# Patient Record
Sex: MALE | Race: BLACK OR AFRICAN AMERICAN | Employment: OTHER | URBAN - METROPOLITAN AREA
[De-identification: names, ages, dates, MRNs, and addresses within clinical notes are randomized per-mention and may not be internally consistent; named-entity substitution may affect disease eponyms.]

---

## 2017-01-31 DIAGNOSIS — I10 ESSENTIAL HYPERTENSION: ICD-10-CM

## 2017-02-01 RX ORDER — VALSARTAN 160 MG/1
TABLET ORAL
Qty: 90 TAB | Refills: 1 | Status: SHIPPED | OUTPATIENT
Start: 2017-02-01 | End: 2017-07-20 | Stop reason: SDUPTHER

## 2017-02-03 ENCOUNTER — TELEPHONE (OUTPATIENT)
Dept: INTERNAL MEDICINE CLINIC | Age: 77
End: 2017-02-03

## 2017-02-03 NOTE — TELEPHONE ENCOUNTER
Pt called in regarding a phone call advising him its time for colonoscopy. I advised that colonoscopy due 3/6/17 and gave pt Dr Laura Angela (per referral order from 9/2016 from Dr Balbina Morris) phone number to call and have it scheduled. He understood and he advised that he was going to call and schedule the colonoscopy.

## 2017-03-24 RX ORDER — ERGOCALCIFEROL 1.25 MG/1
CAPSULE ORAL
Qty: 12 CAP | Refills: 0 | Status: SHIPPED | OUTPATIENT
Start: 2017-03-24 | End: 2017-06-21 | Stop reason: SDUPTHER

## 2017-05-05 RX ORDER — ATORVASTATIN CALCIUM 10 MG/1
10 TABLET, FILM COATED ORAL DAILY
Qty: 90 TAB | Refills: 0 | Status: SHIPPED | OUTPATIENT
Start: 2017-05-05 | End: 2017-10-16 | Stop reason: SDUPTHER

## 2017-05-05 RX ORDER — METFORMIN HYDROCHLORIDE 500 MG/1
500 TABLET, EXTENDED RELEASE ORAL
Qty: 90 TAB | Refills: 0 | Status: SHIPPED | OUTPATIENT
Start: 2017-05-05 | End: 2017-10-16 | Stop reason: SDUPTHER

## 2017-06-06 ENCOUNTER — DOCUMENTATION ONLY (OUTPATIENT)
Dept: INTERNAL MEDICINE CLINIC | Age: 77
End: 2017-06-06

## 2017-06-06 NOTE — PROGRESS NOTES
Patient called and spoke to a PSR on 6/6/17. The PSR was telling the patient that he doesn't have an appt with Dr. Adam Tapia on 6/7/17. In his chart is states that it was cancelled on 6/5/17 @5:39pm.  The patient would not listen to the [facility] so I took the call. I again told the patient that his appointment had been canceled and he didn't have an appt. He then wanted me to call his wife and I stated that I was not going to call his wife,and I offered to reschedule the appt since the appt he originally had was filled already. He said that he confirmed on 6/5/17 and I explained that it was canceled not confirmed. He then said he would be here on 6/7/17. I explained one more time to the patient that we didn't have an appt for him on 6/7/17 and that he would need to reschedule. He then stated that his wife would be calling back and they would see us on 6/7/17.

## 2017-06-22 RX ORDER — ERGOCALCIFEROL 1.25 MG/1
CAPSULE ORAL
Qty: 12 CAP | Refills: 0 | Status: SHIPPED | OUTPATIENT
Start: 2017-06-22 | End: 2017-09-12 | Stop reason: SDUPTHER

## 2017-07-20 ENCOUNTER — HOSPITAL ENCOUNTER (OUTPATIENT)
Dept: LAB | Age: 77
Discharge: HOME | End: 2017-07-20
Payer: MEDICARE

## 2017-07-20 ENCOUNTER — OFFICE VISIT (OUTPATIENT)
Dept: INTERNAL MEDICINE CLINIC | Age: 77
End: 2017-07-20

## 2017-07-20 VITALS
DIASTOLIC BLOOD PRESSURE: 71 MMHG | RESPIRATION RATE: 18 BRPM | HEIGHT: 69 IN | SYSTOLIC BLOOD PRESSURE: 130 MMHG | OXYGEN SATURATION: 98 % | TEMPERATURE: 97.9 F | BODY MASS INDEX: 27.86 KG/M2 | HEART RATE: 60 BPM | WEIGHT: 188.13 LBS

## 2017-07-20 DIAGNOSIS — Z00.00 MEDICARE ANNUAL WELLNESS VISIT, SUBSEQUENT: Primary | ICD-10-CM

## 2017-07-20 DIAGNOSIS — E78.5 HYPERLIPIDEMIA LDL GOAL <100: ICD-10-CM

## 2017-07-20 DIAGNOSIS — I10 ESSENTIAL HYPERTENSION: ICD-10-CM

## 2017-07-20 DIAGNOSIS — Z71.89 ADVANCED CARE PLANNING/COUNSELING DISCUSSION: ICD-10-CM

## 2017-07-20 DIAGNOSIS — Z13.39 SCREENING FOR ALCOHOLISM: ICD-10-CM

## 2017-07-20 DIAGNOSIS — Z13.31 SCREENING FOR DEPRESSION: ICD-10-CM

## 2017-07-20 DIAGNOSIS — E11.9 TYPE 2 DIABETES MELLITUS WITH HEMOGLOBIN A1C GOAL OF LESS THAN 7.0% (HCC): ICD-10-CM

## 2017-07-20 LAB — HBA1C MFR BLD HPLC: 5.7 %

## 2017-07-20 PROCEDURE — 80048 BASIC METABOLIC PNL TOTAL CA: CPT

## 2017-07-20 PROCEDURE — 82043 UR ALBUMIN QUANTITATIVE: CPT

## 2017-07-20 NOTE — PROGRESS NOTES
Nurse Navigator Medicare Wellness Visit performed by AN Burch RN    This is a Subsequent Babar Visit providing Personalized Prevention Plan Services (PPPS) (Performed 12 months after initial AWV and PPPS )    I have reviewed the patient's medical history in detail and updated the computerized patient record. History     Past Medical History:   Diagnosis Date    Appendicitis 9/13/2012    ED (erectile dysfunction) 2/18/2009    HTN 2/18/2009    Hypercholesterolemia 2/18/2009    Seizure disorder ([facility] [address] 2/18/2009    last seizure 4-2011 focal    Stroke [facility])     hx  old lacunar infarct      Past Surgical History:   Procedure Laterality Date    COLONOSCOPY  2002     Current Outpatient Prescriptions   Medication Sig Dispense Refill    VITAMIN D2 50,000 unit capsule TAKE ONE CAPSULE BY MOUTH EVERY 7 DAYS 12 Cap 0    metFORMIN ER (GLUCOPHAGE XR) 500 mg tablet Take 1 Tab by mouth daily (with dinner). 90 Tab 0    atorvastatin (LIPITOR) 10 mg tablet Take 1 Tab by mouth daily. 90 Tab 0    VIAGRA 100 mg tablet TAKE 1 TABLET BY MOUTH AS NEEDED 6 Tab 5    valsartan (DIOVAN) 160 mg tablet TAKE 1 TABLET DAILY 90 Tab 1    aspirin delayed-release (ASPIR-81) 81 mg tablet Take  by mouth daily.  VIMPAT 100 mg Tab tablet Take 1 Tab by mouth two (2) times a day.       KEPPRA 1,000 mg tablet 1 in am  2 in pm  Indications: TONIC-CLONIC EPILEPSY TREATMENT ADJUNCT       No Known Allergies  Family History   Problem Relation Age of Onset    Hypertension Mother     Stroke Mother    Jennifer Imam Dementia Father     Hypertension Sister     Hypertension Brother     Diabetes Other     Cancer Neg Hx      Social History   Substance Use Topics    Smoking status: Never Smoker    Smokeless tobacco: Never Used    Alcohol use No     Patient Active Problem List   Diagnosis Code    Seizure disorder ([facility] [address] G40.909    ED (erectile dysfunction) N52.9    Bilateral inguinal hernia K40.20    Appendicitis K37    Advanced care planning/counseling discussion Z71.89    Type 2 diabetes mellitus with hemoglobin A1c goal of less than 7.0% ([facility]) E11.9    Hyperlipidemia LDL goal <100 E78.5    Essential hypertension I10       Depression Risk Factor Screening:   Patient denies feelings of being down, depressed or hopeless at this time. Patient states that they have a strong support system within their family & friends. PHQ over the last two weeks 7/20/2017   Little interest or pleasure in doing things Not at all   Feeling down, depressed or hopeless Not at all   Total Score PHQ 2 0     Alcohol Risk Factor Screening: On any occasion during the past 3 months, have you had more than 4 drinks containing alcohol? No    Do you average more than 14 drinks per week? No        Functional Ability and Level of Safety:     Hearing Loss   normal-to-mild    Activities of Daily Living   Partial assistance. Patient states that he lives with his wife in a private residence. 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 Patient states that he is independent in all ADL's, but he does not drive due to a history of seizures. Patient states that he employs a , so he is able to get out of the house on his own. Patient denies the use of assistive devices for ambulation. Patient states that he engages in regular exercise, but he often suffers from chronic fatigue due to his medications (patient states that PCP is aware).   Requires assistance with:   ADL Assessment 7/20/2017   Feeding yourself No Help Needed   Getting from bed to chair No Help Needed   Getting dressed No Help Needed   Bathing or showering No Help Needed   Walk across the room (includes cane/walker) No Help Needed   Using the telphone No Help Needed   Taking your medications No Help Needed   Preparing meals No Help Needed   Managing money (expenses/bills) No Help Needed   Moderately strenuous housework (laundry) No Help Needed   Shopping for personal items (toiletries/medicines) No Help Needed   Shopping for groceries No Help Needed   Driving Help Needed   Climbing a flight of stairs No Help Needed   Getting to places beyond walking distances Help Needed       Fall Risk   Fall Risk Assessment, last 12 mths 7/20/2017   Able to walk? Yes   Fall in past 12 months? No   Patient denies falls within the past year & verbalizes awareness of fall prevention strategies. Abuse Screen   Patient is not abused     Abuse Screening Questionnaire 7/20/2017   Do you ever feel afraid of your partner? N   Are you in a relationship with someone who physically or mentally threatens you? N   Is it safe for you to go home? Y       Review of Systems   Medicare Wellness Visit    Physical Examination     Evaluation of Cognitive Function:  Mood/affect:  Fatigued yet pleasant  Appearance: age appropriate and casually dressed  Family member/caregiver input: None present; however, patient reports a strong support system. No exam performed today, Medicare Wellness Visit. Patient Care Team:  Aide Buchanan MD as PCP - General (Internal Medicine)    Advice/Referrals/Counseling   Education and counseling provided:  End-of-Life planning (with patient's consent)  Pneumococcal Vaccine  Influenza Vaccine  Prostate cancer screening tests (PSA, covered annually)  Colorectal cancer screening tests  Screening for glaucoma  tdap & shingles vaccinations      Assessment/Plan   1. Patient states that a completed Advanced Medical Directive is at home. NN encouraged patient to bring a copy of the completed Advanced Medical Directive to the office for scanning into the medical record. Patient verbalized understanding & agreement.     2. Patient is up to date on the following immunizations:  Immunization History   Administered Date(s) Administered    Influenza High Dose Vaccine PF 10/07/2015, 10/04/2016    Influenza Vaccine 10/05/2013    Influenza Vaccine (Whole Virus) 10/30/2012    Influenza Vaccine Split 09/26/2011    Pneumococcal Conjugate (PCV-13) 08/23/2016    Pneumococcal Polysaccharide (PPSV-23) 10/10/2013   Patient confirmed the aforementioned preventative immunization dates are correct. Patient is unable to recall the date of their last tdap & shingles vaccines. NN encouraged patient to check home records & if information obtained, to please notify PCP's office with the details. Patient verbalized agreement. Patient's health maintenance immunization record has been updated & is current. 3. Patient states that he follows his PCP's recommendations for PSA screenings & Colonoscopy screenings. Patient reports that his last screening colonoscopy was completed in 2007 when he lived in [place], [place]. Patient verbalized awareness that he is due for a follow-up screening colonoscopy, but he thinks that he may already have the appointment scheduled. NN provided patient with phone number to Dr. Hiral Nguyễn office, so he can check on possible colonoscopy appointment & then notify PCP's office. 4. Patient wears corrective lenses. Patient reports having a routine eye exam & glaucoma screening within the last two years performed by a local optometrist; he thinks he may have had this completed at [facility], but he is unsure. NN encouraged patient to check home records & if information obtained, to please notify PCP's office with the details. Patient verbalized agreement. Patient verbalized understanding of all information discussed. Patient was given the opportunity to ask questions. Medication reconciliation completed by MA/ LPN and reviewed by PCP. Patient provided AVS, either a printed version or electronic version in [employer], which includes Medicare Wellness Preventative Screening Table.

## 2017-07-20 NOTE — PROGRESS NOTES
HISTORY OF PRESENT ILLNESS  Yolis Poole is a 78 y.o. male. HPI  Diabetes:  He is here for follow up of diabetes. Proteinuria: no  Neuropathy: no  Medication change since last visit:  No   Diabetic Review of Systems - medication compliance: compliant all of the time, diabetic diet compliance: compliant all of the time, home glucose monitoring: is not performed. Lab Results   Component Value Date/Time    Hemoglobin A1c 6.6 01/20/2015 12:02 PM    Hemoglobin A1c (POC) 5.7 07/20/2017 03:40 AM     Lab Results   Component Value Date/Time    Microalb/Creat ratio (ug/mg creat.) 6.6 06/27/2016 03:46 AM         Last Point of Care HGB A1C  Hemoglobin A1c (POC)   Date Value Ref Range Status   07/20/2017 5.7 % Final      Hypertension:  Yolis Poole is a 78 y.o. male with hypertension. without Chronic kidney disease stage    Medication change since last visit: No  The patient reports:  taking medications as instructed, no medication side effects noted, home BP monitoring in range of 736-335'Y systolic over 72'I diastolic, no TIA's, no chest pain on exertion, no dyspnea on exertion, no swelling of ankles, no orthostatic dizziness or lightheadedness, no palpitations. Lifestyle modification/social history: generally follows a low fat low cholesterol diet, sedentary, nonsmoker    Lab Results   Component Value Date/Time    Sodium 145 12/01/2016 12:00 AM    Potassium 4.5 12/01/2016 12:00 AM    Chloride 107 12/01/2016 12:00 AM    CO2 26 12/01/2016 12:00 AM    Anion gap 5 02/20/2012 11:00 AM    Glucose 85 12/01/2016 12:00 AM    BUN 15 12/01/2016 12:00 AM    Creatinine 1.09 12/01/2016 12:00 AM    BUN/Creatinine ratio 14 12/01/2016 12:00 AM    GFR est AA 77 12/01/2016 12:00 AM    GFR est non-AA 67 12/01/2016 12:00 AM    Calcium 9.9 12/01/2016 12:00 AM         Hyperlipidemia:  Yolis Poole is following up on his dyslipidemia. Cardiovascular risks for him are: LDL goal is under 100  diabetic  sedentary lifestyle. Currently he takes Lipitor (atorvastatin) ,   Lab Results   Component Value Date/Time    Cholesterol, total 141 12/01/2016 12:00 AM    Cholesterol, total 223 06/27/2016 03:46 AM    Cholesterol, total 160 07/30/2014 12:46 PM    Cholesterol, total 161 10/16/2013 11:32 AM    Cholesterol, total 213 11/26/2012 10:29 AM    HDL Cholesterol 51 12/01/2016 12:00 AM    HDL Cholesterol 60 06/27/2016 03:46 AM    HDL Cholesterol 70 07/30/2014 12:46 PM    HDL Cholesterol 65 10/16/2013 11:32 AM    HDL Cholesterol 58 11/26/2012 10:29 AM    LDL, calculated 75 12/01/2016 12:00 AM    LDL, calculated 141 06/27/2016 03:46 AM    LDL, calculated 81 07/30/2014 12:46 PM    LDL, calculated 84 10/16/2013 11:32 AM    LDL, calculated 132 11/26/2012 10:29 AM    Triglyceride 76 12/01/2016 12:00 AM    Triglyceride 110 06/27/2016 03:46 AM    Triglyceride 46 07/30/2014 12:46 PM    Triglyceride 62 10/16/2013 11:32 AM    Triglyceride 117 11/26/2012 10:29 AM    CHOL/HDL Ratio 3.2 04/01/2010 09:09 AM     Lab Results   Component Value Date/Time    ALT (SGPT) 21 12/01/2016 12:00 AM    AST (SGOT) 26 12/01/2016 12:00 AM    Alk. phosphatase 61 12/01/2016 12:00 AM    Bilirubin, total 0.8 12/01/2016 12:00 AM       Myalgias: no  Fatigue: yes            ROS    Physical Exam   Constitutional: He appears well-developed and well-nourished. No distress. /71 (BP 1 Location: Left arm, BP Patient Position: Sitting)  Pulse 60  Temp 97.9 °F (36.6 °C) (Oral)   Resp 18  Ht 5' 9\" (1.753 m)  Wt 188 lb 2 oz (85.3 kg)  SpO2 98%  BMI 27.78 kg/m2Body mass index is 27.78 kg/(m^2). HENT:   Mouth/Throat: Oropharynx is clear and moist.   Neck: No JVD present. Carotid bruit is not present. Cardiovascular: Normal rate, regular rhythm, normal heart sounds and intact distal pulses. Pulmonary/Chest: Effort normal and breath sounds normal.   Musculoskeletal: He exhibits no edema. Neurological: He is alert. Skin: Skin is warm and dry. He is not diaphoretic. Nursing note and vitals reviewed. ASSESSMENT and PLAN  Fabricio Johnson was seen today for diabetes. Diagnoses and all orders for this visit:    Medicare wellness visit. Georges Hinojosa received a complete medicare wellness assessment today by Jonatan Mayorga RN. I've reviewed her assessment note and all screening/preventative plans addressed. I also addressed all questions and concerns surrounding the assessment and plans with Georges Hinojosa. Type 2 diabetes mellitus with hemoglobin A1c goal of less than 7.0% (HCC) - Well controlled and stable. his medications were reviewed and refilled where necessary as noted below. Labs ordered as noted. -     AMB POC HEMOGLOBIN A1C  -     MICROALBUMIN, UR, RAND W/ MICROALBUMIN/CREA RATIO    Hyperlipidemia LDL goal <100 - Well controlled and stable. his medications were reviewed and refilled where necessary as noted below. Labs ordered as noted. Recheck next visit    Essential hypertension - Well controlled and stable. his medications were reviewed and refilled where necessary as noted below. Labs ordered as noted. -     METABOLIC PANEL, BASIC      Follow-up Disposition:  Return in about 6 months (around 1/20/2018).

## 2017-07-20 NOTE — MR AVS SNAPSHOT
Visit Information Date & Time Provider Department Dept. Phone Encounter #  
 7/20/2017  3:30 PM Nancy Padilla MD [facility] [address] [id number] Follow-up Instructions Return in about 6 months (around 1/20/2018). Upcoming Health Maintenance Date Due DTaP/Tdap/Td series (1 - Tdap) 11[birth year]4/1961 EYE EXAM RETINAL OR DILATED Q1 12/20/2015 GLAUCOMA SCREENING Q2Y 11/1/2016 COLONOSCOPY 3/6/2017 HEMOGLOBIN A1C Q6M 6/1/2017 MICROALBUMIN Q1 6/27/2017 MEDICARE YEARLY EXAM 6/28/2017 INFLUENZA AGE 9 TO ADULT 8/1/2017 FOOT EXAM Q1 9/20/2017 LIPID PANEL Q1 12/1/2017 Allergies as of 7/20/2017  Review Complete On: 7/20/2017 By: Aman Ramirez LPN No Known Allergies Current Immunizations  Reviewed on 10/12/2016 Name Date Influenza High Dose Vaccine PF 10/4/2016, 10/7/2015 Influenza Vaccine 10/5/2013 Influenza Vaccine (Whole Virus) 10/30/2012 Influenza Vaccine Split 9/26/2011 Pneumococcal Conjugate (PCV-13) 8/23/2016 Pneumococcal Polysaccharide (PPSV-23) 10/10/2013 Not reviewed this visit You Were Diagnosed With   
  
 Codes Comments Medicare annual wellness visit, subsequent    -  Primary ICD-10-CM: Z00.00 ICD-9-CM: V70.0 Type 2 diabetes mellitus with hemoglobin A1c goal of less than 7.0% (HCC)     ICD-10-CM: E11.9 ICD-9-CM: 250.00 Hyperlipidemia LDL goal <100     ICD-10-CM: E78.5 ICD-9-CM: 272.4 Essential hypertension     ICD-10-CM: I10 
ICD-9-CM: 401.9 Vitals BP Pulse Temp Resp Height(growth percentile) Weight(growth percentile) 130/71 (BP 1 Location: Left arm, BP Patient Position: Sitting) 60 97.9 °F (36.6 °C) (Oral) 18 5' 9\" (1.753 m) 188 lb 2 oz (85.3 kg) SpO2 BMI Smoking Status 98% 27.78 kg/m2 Never Smoker Vitals History BMI and BSA Data Body Mass Index Body Surface Area  
 27.78 kg/m 2 2.04 m 2 Preferred Pharmacy Pharmacy Name Phone [facility], [address] [phone] Your Updated Medication List  
  
   
This list is accurate as of: 7/20/17  4:11 PM.  Always use your most recent med list.  
  
  
  
  
 ASPIR-81 81 mg tablet Generic drug:  aspirin delayed-release Take  by mouth daily. atorvastatin 10 mg tablet Commonly known as:  LIPITOR Take 1 Tab by mouth daily. KEPPRA 1,000 mg tablet Generic drug:  levETIRAcetam  
1 in am 2 in pm  Indications: TONIC-CLONIC EPILEPSY TREATMENT ADJUNCT  
  
 metFORMIN  mg tablet Commonly known as:  GLUCOPHAGE XR Take 1 Tab by mouth daily (with dinner). valsartan 160 mg tablet Commonly known as:  DIOVAN  
TAKE 1 TABLET DAILY VIAGRA 100 mg tablet Generic drug:  sildenafil citrate TAKE 1 TABLET BY MOUTH AS NEEDED  
  
 VIMPAT 100 mg Tab tablet Generic drug:  lacosamide Take 1 Tab by mouth two (2) times a day. VITAMIN D2 50,000 unit capsule Generic drug:  ergocalciferol TAKE ONE CAPSULE BY MOUTH EVERY 7 DAYS We Performed the Following AMB POC HEMOGLOBIN A1C [89563 CPT(R)] METABOLIC PANEL, BASIC [10122 CPT(R)] MICROALBUMIN, UR, RAND W/ MICROALBUMIN/CREA RATIO P0036371 CPT(R)] Follow-up Instructions Return in about 6 months (around 1/20/2018). Introducing [facility]! Cecy Fenton introduces [employer] patient portal. Now you can access parts of your medical record, email your doctor's office, and request medication refills online. 1. In your internet browser, go to [email or web address] 2. Click on the First Time User? Click Here link in the Sign In box. You will see the New Member Sign Up page. 3. Enter your [employer] Access Code exactly as it appears below. You will not need to use this code after youve completed the sign-up process. If you do not sign up before the expiration date, you must request a new code. · [employer] Access Code: [email or web address] Expires: 10/18/2017  4:11 PM 
 
4. Enter the last four digits of your Social Security Number (xxxx) and Date of Birth (mm/dd/yyyy) as indicated and click Submit. You will be taken to the next sign-up page. 5. Create a [employer] ID. This will be your [employer] login ID and cannot be changed, so think of one that is secure and easy to remember. 6. Create a [employer] password. You can change your password at any time. 7. Enter your Password Reset Question and Answer. This can be used at a later time if you forget your password. 8. Enter your e-mail address. You will receive e-mail notification when new information is available in [address] 9. Click Sign Up. You can now view and download portions of your medical record. 10. Click the Download Summary menu link to download a portable copy of your medical information. If you have questions, please visit the Frequently Asked Questions section of the [employer] website. Remember, [employer] is NOT to be used for urgent needs. For medical emergencies, dial 911. Now available from your iPhone and Android! Please provide this summary of care documentation to your next provider. Your primary care clinician is listed as Betty Solis. If you have any questions after today's visit, please call [phone].

## 2017-07-20 NOTE — Clinical Note
Medicare Wellness note completed. Thank you in advance. Robert Guthrie. Mr. Talia Peres thinks that he may have a screening colonoscopy appointment scheduled with Dr. Naman Andino, but he can't remember. I gave him Dr. Patrick Meyer office number so he can check. He will let us know. Thank you again!

## 2017-07-20 NOTE — PATIENT INSTRUCTIONS
Medicare Part B Preventive Services Guidelines/Limitations Date last completed and Frequency Due Date   Cardiovascular Screening Blood Tests (every 5 years)  Total cholesterol, HDL, Triglycerides Order as a panel if possible Completed 12/2016    As recommended by your PCP As recommended by your PCP or Specialist   Colorectal Cancer Screening  -Fecal occult blood test (annual)  -Flexible sigmoidoscopy (5y)  -Screening colonoscopy (10y)  -Barium Enema Age 49-80; After age [de-identified] if history of abnormal results Completed 2007     Recommended every 5 to 10 years  Due now - Please call Dr. Vazquez Almodovar office to see if colonoscopy appointment has already been scheduled. Thank you     Counseling to Prevent Tobacco Use (up to 8 sessions per year)  - Counseling greater than 3 and up to 10 minutes  - Counseling greater than 10 minutes Patients must be asymptomatic of tobacco-related conditions to receive as preventive service N/A N/A   Diabetes Screening Tests (at least every 3 years, Medicare covers annually or at 6-month intervals for prediabetic patients)    Fasting blood sugar (FBS) or glucose tolerance test (GTT) Patient must be diagnosed with one of the following:  -Hypertension, Dyslipidemia, obesity, previous impaired FBS or GTT  Or any two of the following: overweight, FH of diabetes, age ? 72, history of gestational diabetes, birth of baby weighing more than 9 pounds Completed 12/2016    Recommended every 3-6 months for Pre-Diabetics and Diabetics As recommended by your PCP or Specialist     Glaucoma Screening (no USPSTF recommendation) Diabetes mellitus, family history, , age 48 or over,  American, age 72 or over Completed within the last two years    Recommended annually As recommended by your PCP or Specialist   Prostate Cancer Screening (annually up to age 76)  - Digital rectal exam (LANEY)  - Prostate specific antigen (PSA) Annually (age 48 or over), LANEY not paid separately when covered E/M service is provided on same date As recommended by your PCP or Specialist    Recommended annually to age 76 As recommended by your PCP or Specialist     Seasonal Influenza Vaccination (annually)  Completed 10/2016    Recommended Annually Due Fall 2017   TDAP Vaccination  As recommended by your PCP or Specialist    Recommended every 10 years As recommended by your PCP or Specialist   Zoster (Shingles) Vaccination Covered by Medicare Part D through the pharmacy- PCP provides prescription Completed several years ago    Recommended once over age 48  Complete   Pneumococcal Vaccination (once after 72)  Pneumo 23- 10/2013  Recommended once over the age of 72    Prevnar 15- 8/2016 Recommended once over the age of 72 Complete        Complete   Ultrasound Screening for Abdominal Aortic Aneurysm (AAA) (once) Patient must be referred through IPPE and not have had a screening for abdominal aortic aneurysm before under Medicare. Limited to patients who meet one of the following criteria:  - Men who are 73-68 years old and have smoked more than 100 cigarettes in their lifetime.  -Anyone with a FH of AAA  -Anyone recommended for screening by USPSTF Not indicated unless recommended by PCP   Not indicated unless recommended by PCP     Family Practice Management 2011    Please bring a copy of your completed advance medical directive to the office so it may be added to your medical record. Thank you. If you have any questions or concerns please feel free to contact me at [phone]. It was a pleasure meeting you today and participating in your healthcare.   Elizabeth Hilton RN

## 2017-07-21 LAB
ALBUMIN/CREAT UR: 5.1 MG/G CREAT (ref 0–30)
BUN SERPL-MCNC: 15 MG/DL (ref 8–27)
BUN/CREAT SERPL: 13 (ref 10–24)
CALCIUM SERPL-MCNC: 9.4 MG/DL (ref 8.6–10.2)
CHLORIDE SERPL-SCNC: 105 MMOL/L (ref 96–106)
CO2 SERPL-SCNC: 25 MMOL/L (ref 18–29)
CREAT SERPL-MCNC: 1.13 MG/DL (ref 0.76–1.27)
CREAT UR-MCNC: 233.9 MG/DL
GLUCOSE SERPL-MCNC: 76 MG/DL (ref 65–99)
MICROALBUMIN UR-MCNC: 11.9 UG/ML
POTASSIUM SERPL-SCNC: 4.6 MMOL/L (ref 3.5–5.2)
SODIUM SERPL-SCNC: 146 MMOL/L (ref 134–144)

## 2017-09-12 RX ORDER — ERGOCALCIFEROL 1.25 MG/1
CAPSULE ORAL
Qty: 12 CAP | Refills: 0 | Status: ON HOLD | OUTPATIENT
Start: 2017-09-12 | End: 2017-10-23

## 2017-10-17 RX ORDER — ATORVASTATIN CALCIUM 10 MG/1
TABLET, FILM COATED ORAL
Qty: 90 TAB | Refills: 0 | Status: SHIPPED | OUTPATIENT
Start: 2017-10-17 | End: 2017-11-13 | Stop reason: SDUPTHER

## 2017-10-17 RX ORDER — METFORMIN HYDROCHLORIDE 500 MG/1
TABLET, EXTENDED RELEASE ORAL
Qty: 90 TAB | Refills: 0 | Status: SHIPPED | OUTPATIENT
Start: 2017-10-17 | End: 2017-11-13 | Stop reason: SDUPTHER

## 2017-10-23 ENCOUNTER — ANESTHESIA EVENT (OUTPATIENT)
Dept: ENDOSCOPY | Age: 77
End: 2017-10-23
Payer: MEDICARE

## 2017-10-23 ENCOUNTER — ANESTHESIA (OUTPATIENT)
Dept: ENDOSCOPY | Age: 77
End: 2017-10-23
Payer: MEDICARE

## 2017-10-23 ENCOUNTER — HOSPITAL ENCOUNTER (OUTPATIENT)
Age: 77
Setting detail: OUTPATIENT SURGERY
Discharge: HOME | End: 2017-10-23
Attending: INTERNAL MEDICINE | Admitting: INTERNAL MEDICINE
Payer: MEDICARE

## 2017-10-23 VITALS
WEIGHT: 185 LBS | SYSTOLIC BLOOD PRESSURE: 136 MMHG | OXYGEN SATURATION: 100 % | HEIGHT: 69 IN | TEMPERATURE: 97.8 F | BODY MASS INDEX: 27.4 KG/M2 | DIASTOLIC BLOOD PRESSURE: 72 MMHG | HEART RATE: 58 BPM | RESPIRATION RATE: 15 BRPM

## 2017-10-23 LAB
GLUCOSE BLD STRIP.AUTO-MCNC: 110 MG/DL (ref 65–100)
GLUCOSE BLD STRIP.AUTO-MCNC: 78 MG/DL (ref 65–100)
SERVICE CMNT-IMP: ABNORMAL
SERVICE CMNT-IMP: NORMAL

## 2017-10-23 PROCEDURE — 82962 GLUCOSE BLOOD TEST: CPT

## 2017-10-23 PROCEDURE — 76060000031 HC ANESTHESIA FIRST 0.5 HR: Performed by: INTERNAL MEDICINE

## 2017-10-23 PROCEDURE — 77030009426 HC FCPS BIOP ENDOSC BSC -B: Performed by: INTERNAL MEDICINE

## 2017-10-23 PROCEDURE — 88305 TISSUE EXAM BY PATHOLOGIST: CPT | Performed by: INTERNAL MEDICINE

## 2017-10-23 PROCEDURE — 76040000019: Performed by: INTERNAL MEDICINE

## 2017-10-23 PROCEDURE — 74011000258 HC RX REV CODE- 258: Performed by: ANESTHESIOLOGY

## 2017-10-23 PROCEDURE — 74011000250 HC RX REV CODE- 250

## 2017-10-23 PROCEDURE — 74011250636 HC RX REV CODE- 250/636

## 2017-10-23 PROCEDURE — 74011250636 HC RX REV CODE- 250/636: Performed by: INTERNAL MEDICINE

## 2017-10-23 PROCEDURE — 77030013992 HC SNR POLYP ENDOSC BSC -B: Performed by: INTERNAL MEDICINE

## 2017-10-23 RX ORDER — MIDAZOLAM HYDROCHLORIDE 1 MG/ML
.25-5 INJECTION, SOLUTION INTRAMUSCULAR; INTRAVENOUS
Status: DISCONTINUED | OUTPATIENT
Start: 2017-10-23 | End: 2017-10-23 | Stop reason: HOSPADM

## 2017-10-23 RX ORDER — PROPOFOL 10 MG/ML
INJECTION, EMULSION INTRAVENOUS AS NEEDED
Status: DISCONTINUED | OUTPATIENT
Start: 2017-10-23 | End: 2017-10-23 | Stop reason: HOSPADM

## 2017-10-23 RX ORDER — SODIUM CHLORIDE 9 MG/ML
50 INJECTION, SOLUTION INTRAVENOUS CONTINUOUS
Status: DISCONTINUED | OUTPATIENT
Start: 2017-10-23 | End: 2017-10-23 | Stop reason: HOSPADM

## 2017-10-23 RX ORDER — SODIUM CHLORIDE 9 MG/ML
INJECTION, SOLUTION INTRAVENOUS
Status: DISCONTINUED | OUTPATIENT
Start: 2017-10-23 | End: 2017-10-23 | Stop reason: HOSPADM

## 2017-10-23 RX ORDER — NALOXONE HYDROCHLORIDE 0.4 MG/ML
0.4 INJECTION, SOLUTION INTRAMUSCULAR; INTRAVENOUS; SUBCUTANEOUS
Status: DISCONTINUED | OUTPATIENT
Start: 2017-10-23 | End: 2017-10-23 | Stop reason: HOSPADM

## 2017-10-23 RX ORDER — EPINEPHRINE 0.1 MG/ML
1 INJECTION INTRACARDIAC; INTRAVENOUS
Status: DISCONTINUED | OUTPATIENT
Start: 2017-10-23 | End: 2017-10-23 | Stop reason: HOSPADM

## 2017-10-23 RX ORDER — FLUMAZENIL 0.1 MG/ML
0.2 INJECTION INTRAVENOUS
Status: DISCONTINUED | OUTPATIENT
Start: 2017-10-23 | End: 2017-10-23 | Stop reason: HOSPADM

## 2017-10-23 RX ORDER — ATROPINE SULFATE 0.1 MG/ML
0.4 INJECTION INTRAVENOUS
Status: DISCONTINUED | OUTPATIENT
Start: 2017-10-23 | End: 2017-10-23 | Stop reason: HOSPADM

## 2017-10-23 RX ORDER — PROPOFOL 10 MG/ML
INJECTION, EMULSION INTRAVENOUS
Status: DISCONTINUED | OUTPATIENT
Start: 2017-10-23 | End: 2017-10-23 | Stop reason: HOSPADM

## 2017-10-23 RX ORDER — DEXTROSE MONOHYDRATE 25 G/50ML
12.5 INJECTION, SOLUTION INTRAVENOUS
Status: COMPLETED | OUTPATIENT
Start: 2017-10-23 | End: 2017-10-23

## 2017-10-23 RX ORDER — DEXTROMETHORPHAN/PSEUDOEPHED 2.5-7.5/.8
1.2 DROPS ORAL
Status: DISCONTINUED | OUTPATIENT
Start: 2017-10-23 | End: 2017-10-23 | Stop reason: HOSPADM

## 2017-10-23 RX ORDER — LIDOCAINE HYDROCHLORIDE 20 MG/ML
INJECTION, SOLUTION EPIDURAL; INFILTRATION; INTRACAUDAL; PERINEURAL AS NEEDED
Status: DISCONTINUED | OUTPATIENT
Start: 2017-10-23 | End: 2017-10-23 | Stop reason: HOSPADM

## 2017-10-23 RX ADMIN — SODIUM CHLORIDE: 9 INJECTION, SOLUTION INTRAVENOUS at 12:30

## 2017-10-23 RX ADMIN — PROPOFOL 20 MG: 10 INJECTION, EMULSION INTRAVENOUS at 14:01

## 2017-10-23 RX ADMIN — PROPOFOL 20 MG: 10 INJECTION, EMULSION INTRAVENOUS at 14:03

## 2017-10-23 RX ADMIN — PROPOFOL 20 MG: 10 INJECTION, EMULSION INTRAVENOUS at 13:56

## 2017-10-23 RX ADMIN — PROPOFOL 40 MG: 10 INJECTION, EMULSION INTRAVENOUS at 13:53

## 2017-10-23 RX ADMIN — SODIUM CHLORIDE: 9 INJECTION, SOLUTION INTRAVENOUS at 14:10

## 2017-10-23 RX ADMIN — DEXTROSE MONOHYDRATE 12.5 G: 25 INJECTION, SOLUTION INTRAVENOUS at 13:25

## 2017-10-23 RX ADMIN — PROPOFOL 140 MCG/KG/MIN: 10 INJECTION, EMULSION INTRAVENOUS at 13:53

## 2017-10-23 RX ADMIN — SODIUM CHLORIDE 50 ML/HR: 900 INJECTION, SOLUTION INTRAVENOUS at 13:31

## 2017-10-23 RX ADMIN — LIDOCAINE HYDROCHLORIDE 20 MG: 20 INJECTION, SOLUTION EPIDURAL; INFILTRATION; INTRACAUDAL; PERINEURAL at 13:51

## 2017-10-23 NOTE — IP AVS SNAPSHOT
Zohaib Jaramillo 
 
 
 [address] [place] [address] 
[phone] Patient: Damon Mason MRN: [id number] UXD:[birth year] You are allergic to the following No active allergies Recent Documentation Height Weight BMI Smoking Status 1.753 m 83.9 kg 27.32 kg/m2 Never Smoker Emergency Contacts Name Discharge Info Relation Home Work Mobile D.,Paulette DISCHARGE CAREGIVER [3] Spouse [3] [phone] About your hospitalization You were admitted on:  October 23, 2017 You last received care in the:  [facility] ENDOSCOPY You were discharged on:  October 23, 2017 Unit phone number:  [phone] Why you were hospitalized Your primary diagnosis was:  Not on File Providers Seen During Your Hospitalizations Provider Role Specialty Primary office phone Alexus Robbins MD Attending Provider Gastroenterology [phone] Your Primary Care Physician (PCP) Primary Care Physician Office Phone Office Fax Arian Wilburn [phone] Follow-up Information None Current Discharge Medication List  
  
CONTINUE these medications which have NOT CHANGED Dose & Instructions Dispensing Information Comments Morning Noon Evening Bedtime ASPIR-81 81 mg tablet Generic drug:  aspirin delayed-release Your last dose was: Your next dose is: Take  by mouth daily. Refills:  0  
     
   
   
   
  
 atorvastatin 10 mg tablet Commonly known as:  LIPITOR Your last dose was: Your next dose is: TAKE ONE TABLET BY MOUTH DAILY Quantity:  90 Tab Refills:  0 KEPPRA 1,000 mg tablet Generic drug:  levETIRAcetam  
   
Your last dose was: Your next dose is:    
   
   
 1 in am 2 in pm  Indications: TONIC-CLONIC EPILEPSY TREATMENT ADJUNCT Refills:  0 metFORMIN  mg tablet Commonly known as:  GLUCOPHAGE XR Your last dose was: Your next dose is: TAKE ONE TABLET BY MOUTH DAILY WITH DINNER Quantity:  90 Tab Refills:  0  
     
   
   
   
  
 valsartan 160 mg tablet Commonly known as:  DIOVAN Your last dose was: Your next dose is: TAKE 1 TABLET DAILY Quantity:  90 Tab Refills:  1 VIAGRA 100 mg tablet Generic drug:  sildenafil citrate Your last dose was: Your next dose is: TAKE 1 TABLET BY MOUTH AS NEEDED Quantity:  6 Tab Refills:  5 VIMPAT 100 mg Tab tablet Generic drug:  lacosamide Your last dose was: Your next dose is:    
   
   
 Dose:  1 Tab Take 1 Tab by mouth two (2) times a day. Refills:  0 Discharge Instructions [address] [facility] Leland Ferreira M.D. 
[phone] COLON DISCHARGE INSTRUCTIONS 
    
10/23/2017 Anay Velasco :  1940 [place] Medical Record Number:  [id number] COLONOSCOPY FINDINGS: 
Your colonoscopy showed two small polyps that were removed and sent to pathology and small internal hemorrhoids. DISCOMFORT: 
Redness at IV site- apply warm compress to area; if redness or soreness persist- contact your physician There may be a slight amount of blood passed from the rectum Gaseous discomfort- walking, belching will help relieve any discomfort You may not operate a vehicle for 12 hours You may not engage in an occupation involving machinery or appliances for rest of today You may not drink alcoholic beverages for at least 12 hours Avoid making any critical decisions for at least 24 hour DIET: 
 High fiber diet.  however -  remember your colon is empty and a heavy meal will produce gas. Avoid these foods:  vegetables, fried / greasy foods, carbonated drinks for today ACTIVITY: 
 You may resume your normal daily activities it is recommended that you spend the remainder of the day resting -  avoid any strenuous activity. CALL M.D. ANY SIGN OF: Increasing pain, nausea, vomiting Abdominal distension (swelling) New increased bleeding (oral or rectal) Fever (chills) Pain in chest area Bloody discharge from nose or mouth Shortness of breath Follow-up Instructions: 
 Call Dr. Daniella Saavedra if any questions or problems. Telephone # [phone] Biopsy results will be available in  5 to 7 days Should have a repeat colonoscopy in 5 years if polyps show adenoma. Discharge Orders None ACO Transitions of Care Introducing [facility] [address] offers a voluntary care coordination program to provide high quality service and care to [facility] fee-for-service beneficiaries. Alondra Miller was designed to help you enhance your health and well-being through the following services: ? Transitions of Care  support for individuals who are transitioning from one care setting to another (example: Hospital to home). ? Chronic and Complex Care Coordination  support for individuals and caregivers of those with serious or chronic illnesses or with more than one chronic (ongoing) condition and those who take a number of different medications. If you meet specific medical criteria, a [facility] [address] may call you directly to coordinate your care with your primary care physician and your other care providers. For questions about the [facility] programs, please, contact your physicians office. For general questions or additional information about Accountable Care Organizations: 
Please visit www.medicare.gov/acos. html or call 1-800-MEDICARE (8-414.897.7605) TTY users should call 3-854.367.1448. Introducing [facility] SERVICES! [facility] introduces [employer] patient portal. Now you can access parts of your medical record, email your doctor's office, and request medication refills online. 1. In your internet browser, go to [email or web address] 2. Click on the First Time User? Click Here link in the Sign In box. You will see the New Member Sign Up page. 3. Enter your [employer] Access Code exactly as it appears below. You will not need to use this code after youve completed the sign-up process. If you do not sign up before the expiration date, you must request a new code. · [employer] Access Code: [email or web address] Expires: 1/21/2018 11:46 AM 
 
4. Enter the last four digits of your Social Security Number (xxxx) and Date of Birth (mm/dd/yyyy) as indicated and click Submit. You will be taken to the next sign-up page. 5. Create a [employer] ID. This will be your [employer] login ID and cannot be changed, so think of one that is secure and easy to remember. 6. Create a [employer] password. You can change your password at any time. 7. Enter your Password Reset Question and Answer. This can be used at a later time if you forget your password. 8. Enter your e-mail address. You will receive e-mail notification when new information is available in [address] 9. Click Sign Up. You can now view and download portions of your medical record. 10. Click the Download Summary menu link to download a portable copy of your medical information. If you have questions, please visit the Frequently Asked Questions section of the [employer] website. Remember, [employer] is NOT to be used for urgent needs. For medical emergencies, dial 911. Now available from your iPhone and Android! General Information Please provide this summary of care documentation to your next provider. Patient Signature:  ____________________________________________________________ Date:  ____________________________________________________________  
  
Orbie Bar Provider Signature:  ____________________________________________________________ Date:  ____________________________________________________________

## 2017-10-23 NOTE — ANESTHESIA PREPROCEDURE EVALUATION
Anesthetic History   No history of anesthetic complications            Review of Systems / Medical History  Patient summary reviewed and pertinent labs reviewed    Pulmonary  Within defined limits                 Neuro/Psych     seizures (last seizure 1 week ago per wife): well controlled  CVA (patient denies)       Cardiovascular    Hypertension: well controlled          Hyperlipidemia    Exercise tolerance: >4 METS     GI/Hepatic/Renal  Within defined limits              Endo/Other    Diabetes: type 2    Arthritis     Other Findings              Physical Exam    Airway  Mallampati: II  TM Distance: 4 - 6 cm  Neck ROM: normal range of motion   Mouth opening: Normal     Cardiovascular    Rhythm: regular  Rate: normal         Dental    Dentition: Upper dentition intact, Lower dentition intact and Caps/crowns     Pulmonary                 Abdominal         Other Findings            Anesthetic Plan    ASA: 3  Anesthesia type: MAC          Induction: Intravenous  Anesthetic plan and risks discussed with: Patient

## 2017-10-23 NOTE — ANESTHESIA POSTPROCEDURE EVALUATION
Post-Anesthesia Evaluation and Assessment    Patient: Carson Ortiz MRN: [id number]  SSN: [id number]    YOB: 1940  Age: 78 y.o. Sex: male       Cardiovascular Function/Vital Signs  Visit Vitals    /72    Pulse (!) 58    Temp 36.6 °C (97.8 °F)    Resp 15    Ht 5' 9\" (1.753 m)    Wt 83.9 kg (185 lb)    SpO2 100%    BMI 27.32 kg/m2       Patient is status post MAC anesthesia for Procedure(s):  COLONOSCOPY  ENDOSCOPIC POLYPECTOMY. Nausea/Vomiting: None    Postoperative hydration reviewed and adequate. Pain:  Pain Scale 1: Numeric (0 - 10) (10/23/17 1443)  Pain Intensity 1: 0 (10/23/17 1443)   Managed    Neurological Status: At baseline    Mental Status and Level of Consciousness: Arousable    Pulmonary Status:   O2 Device: Room air (10/23/17 1443)   Adequate oxygenation and airway patent    Complications related to anesthesia: None    Post-anesthesia assessment completed.  No concerns    Signed By: Sushma Jewell MD     October 23, 2017

## 2017-10-23 NOTE — ROUTINE PROCESS
Janice Black  1940  [id number]    Situation:  Verbal report received from: Jona Milian RN  Procedure: Procedure(s):  COLONOSCOPY  ENDOSCOPIC POLYPECTOMY    Background:    Preoperative diagnosis: SCREENING  Postoperative diagnosis: polyp, hemorrhoids    :  Dr. Ethan Pisano  Assistant(s): Endoscopy Technician-1: Inocente Mcclendon  Endoscopy RN-1: Jona Milian RN    Specimens:   ID Type Source Tests Collected by Time Destination   1 : polyp Saline Colon, Ascending  John Diggs MD 10/23/2017 1407 Pathology   2 : polyp Saline Colon, Transverse  John Diggs MD 10/23/2017 1408 Pathology     H. Pylori  no    Assessment:  Intra-procedure medications   Anesthesia gave intra-procedure sedation and medications, see anesthesia flow sheet yes    Intravenous fluids: NS@ KVO     Vital signs stable     Abdominal assessment: round and soft     Recommendation:  Discharge patient per MD order.   Family   Permission to share finding with family or friend yes

## 2017-10-23 NOTE — PROCEDURES
Cassondra Brunner, M.D.  [phone]            10/23/2017          Colonoscopy Operative Report  Chelsi Lynch  :  1940  [place] Medical Record Number:  [id number]      Indications:    Screening colonoscopy     :  Joseph Oshea MD    Referring Provider: Remy Smith MD    Sedation:  MAC anesthesia    Pre-Procedural Exam:      Airway: clear,  No airway problems anticipated  Heart: RRR, without gallops or rubs  Lungs: clear bilaterally without wheezes, crackles, or rhonchi  Abdomen: soft, nontender, nondistended, bowel sounds present  Mental Status: awake, alert and oriented to person, place and time     Procedure Details:  After informed consent was obtained with all risks and benefits of procedure explained and preoperative exam completed, the patient was taken to the endoscopy suite and placed in the left lateral decubitus position. Upon sequential sedation as per above, a digital rectal exam was performed. The Olympus videocolonoscope  was inserted in the rectum and carefully advanced to the cecum, which was identified by the ileocecal valve and appendiceal orifice. The quality of preparation was good. The colonoscope was slowly withdrawn with careful inspection and evaluation between folds. Retroflexion in the rectum was performed. Findings:   Terminal Ileum: not intubated  Cecum: normal  Ascending Colon: 1  Sessile polyp(s), the largest 2 mm in size;  Transverse Colon: 1  Sessile polyp(s), the largest 3 mm in size; Descending Colon: normal  Sigmoid: normal  Rectum: no mucosal lesion appreciated  Grade 1 internal hemorrhoid(s);     Interventions:  2 complete polypectomy were performed using cold biopsy forceps and cold snare  and the polyps were  retrieved    Specimen Removed:  specimen #1, 2 mm in size, located in the ascending colon removed by cold biopsy and sent for pathology  #2, 3 mm in size, located in removed by cold biopsy and sent for pathology removed by cold snare and retrieved for pathology    Complications: None. EBL:  None. Impression:  Two diminutive polyps removed and sent to pathology, otherwise mucosa within normal.                         Small internal hemorrhoids    Recommendations:  -If adenoma is present, repeat colonoscopy in 5 years.   -High fiber diet.    -Resume normal medication(s). Discharge Disposition:  Home in the company of a  when able to ambulate.     Rita Loyola MD  10/23/2017  2:16 PM

## 2017-10-23 NOTE — DISCHARGE INSTRUCTIONS
[facility]. [facility] KIMBERLEE Childs  [phone]            COLON DISCHARGE INSTRUCTIONS       10/23/2017    Rohini Barriga  :  1940  [place] Medical Record Number:  [id number]      COLONOSCOPY FINDINGS:  Your colonoscopy showed two small polyps that were removed and sent to pathology and small internal hemorrhoids. DISCOMFORT:  Redness at IV site- apply warm compress to area; if redness or soreness persist- contact your physician  There may be a slight amount of blood passed from the rectum  Gaseous discomfort- walking, belching will help relieve any discomfort  You may not operate a vehicle for 12 hours  You may not engage in an occupation involving machinery or appliances for rest of today  You may not drink alcoholic beverages for at least 12 hours  Avoid making any critical decisions for at least 24 hour  DIET:   High fiber diet. - however -  remember your colon is empty and a heavy meal will produce gas. Avoid these foods:  vegetables, fried / greasy foods, carbonated drinks for today     ACTIVITY:  You may resume your normal daily activities it is recommended that you spend the remainder of the day resting -  avoid any strenuous activity. CALL M.D. ANY SIGN OF:   Increasing pain, nausea, vomiting  Abdominal distension (swelling)  New increased bleeding (oral or rectal)  Fever (chills)  Pain in chest area  Bloody discharge from nose or mouth   Shortness of breath    Follow-up Instructions:   Call Dr. Johnnie Giordano if any questions or problems. Telephone # [phone]  Biopsy results will be available in  5 to 7 days  Should have a repeat colonoscopy in 5 years if polyps show adenoma.

## 2017-10-23 NOTE — PERIOP NOTES
Received report from [employer]. See anesthesia record. ABD remains soft and non-tender post procedure. Pt has no complaints at this time and tolerated the procedure well.

## 2017-10-23 NOTE — H&P
Kamilla Portillo M.D.  [phone]            History and Physical       NAME:  Tilden Carrel   :   1940   MRN:   [id number]       Referring Physician:  Dr. Juaquin Yang Date: 10/23/2017 1:17 PM    Chief Complaint:  Colon cancer screening    History of Present Illness:  Patient is a 78 y. o. who is seen for colon cancer screening. Denies any ongoing GI complaints. PMH:  Past Medical History:   Diagnosis Date    Appendicitis 2012    ED (erectile dysfunction) 2009    HTN 2009    Hypercholesterolemia 2009    Seizure disorder ([address] 2009    last seizure 4- focal    Stroke [facility])     hx  old lacunar infarct       PSH:  Past Surgical History:   Procedure Laterality Date    COLONOSCOPY      HX APPENDECTOMY      HX HERNIA REPAIR         Allergies:  No Known Allergies    Home Medications:  Prior to Admission Medications   Prescriptions Last Dose Informant Patient Reported? Taking? KEPPRA 1,000 mg tablet 10/22/2017 at Unknown time  Yes Yes   Si in am  2 in pm  Indications: TONIC-CLONIC EPILEPSY TREATMENT ADJUNCT   VIAGRA 100 mg tablet 2017 at Unknown time  No Yes   Sig: TAKE 1 TABLET BY MOUTH AS NEEDED   VIMPAT 100 mg Tab tablet 10/23/2017 at 0700am  Yes Yes   Sig: Take 1 Tab by mouth two (2) times a day. aspirin delayed-release (ASPIR-81) 81 mg tablet 10/22/2017 at Unknown time  Yes Yes   Sig: Take  by mouth daily.    atorvastatin (LIPITOR) 10 mg tablet 10/22/2017 at Unknown time  No Yes   Sig: TAKE ONE TABLET BY MOUTH DAILY   metFORMIN ER (GLUCOPHAGE XR) 500 mg tablet 10/22/2017 at Unknown time  No Yes   Sig: TAKE ONE TABLET BY MOUTH DAILY WITH DINNER   valsartan (DIOVAN) 160 mg tablet 10/22/2017 at Unknown time  No Yes   Sig: TAKE 1 TABLET DAILY      Facility-Administered Medications: None       Hospital Medications:  Current Facility-Administered Medications   Medication Dose Route Frequency    0.9% sodium chloride infusion  50 mL/hr IntraVENous CONTINUOUS    midazolam (VERSED) injection 0.25-5 mg  0.25-5 mg IntraVENous Multiple    naloxone (NARCAN) injection 0.4 mg  0.4 mg IntraVENous Multiple    flumazenil (ROMAZICON) 0.1 mg/mL injection 0.2 mg  0.2 mg IntraVENous Multiple    simethicone (MYLICON) 15FQ/4.6OU oral drops 80 mg  1.2 mL Oral Multiple    atropine injection 0.4 mg  0.4 mg IntraVENous ONCE PRN    EPINEPHrine (ADRENALIN) 0.1 mg/mL syringe 1 mg  1 mg Endoscopically ONCE PRN    dextrose (D50W) injection syrg 12.5 g  12.5 g IntraVENous NOW       Social History:  Social History   Substance Use Topics    Smoking status: Never Smoker    Smokeless tobacco: Never Used    Alcohol use No       Family History:  Family History   Problem Relation Age of Onset    Hypertension Mother     Stroke Mother    [place] Spell Dementia Father     Hypertension Sister     Hypertension Brother     Diabetes Other     Cancer Neg Hx              Review of Systems:      Constitutional: negative fever, negative chills, negative weight loss  Eyes:   negative visual changes  ENT:   negative sore throat, tongue or lip swelling  Respiratory:  negative cough, negative dyspnea  Cards:  negative for chest pain, palpitations, lower extremity edema  GI:   See HPI  :  negative for frequency, dysuria  Integument:  negative for rash and pruritus  Heme:  negative for easy bruising and gum/nose bleeding  Musculoskel: negative for myalgias,  back pain and muscle weakness  Neuro: negative for headaches, dizziness, vertigo  Psych:  negative for feelings of anxiety, depression       Objective:   No data found. EXAM:     NEURO-a&o   HEENT-wnl   LUNGS-clear    COR-regular rate and rhythym     ABD-soft , no tenderness, no rebound, good bs     EXT-no edema     Data Review     No results for input(s): WBC, HGB, HCT, PLT, HGBEXT, HCTEXT, PLTEXT in the last 72 hours.   No results for input(s): NA, K, CL, CO2, BUN, CREA, GLU, PHOS, CA in the last 72 hours. No results for input(s): SGOT, GPT, AP, TBIL, TP, ALB, GLOB, GGT, AML, LPSE in the last 72 hours. No lab exists for component: AMYP, HLPSE  No results for input(s): INR, PTP, APTT in the last 72 hours. No lab exists for component: INREXT    Patient Active Problem List   Diagnosis Code    Seizure disorder ([facility] [address] G40.909    ED (erectile dysfunction) N52.9    Bilateral inguinal hernia K40.20    Appendicitis K37    Advanced care planning/counseling discussion Z71.89    Type 2 diabetes mellitus with hemoglobin A1c goal of less than 7.0% (HCC) E11.9    Hyperlipidemia LDL goal <100 E78.5    Essential hypertension I10      Assessment:   · Colon cancer screening   Plan:   · Colonoscopy today.      Signed By: Felipe Gomez MD     10/23/2017  1:17 PM

## 2017-12-01 RX ORDER — ERGOCALCIFEROL 1.25 MG/1
CAPSULE ORAL
Qty: 12 CAP | Refills: 0 | Status: SHIPPED | OUTPATIENT
Start: 2017-12-01 | End: 2018-02-27 | Stop reason: SDUPTHER

## 2018-01-30 ENCOUNTER — HOSPITAL ENCOUNTER (OUTPATIENT)
Dept: LAB | Age: 78
Discharge: HOME | End: 2018-01-30
Payer: MEDICARE

## 2018-01-30 ENCOUNTER — OFFICE VISIT (OUTPATIENT)
Dept: INTERNAL MEDICINE CLINIC | Age: 78
End: 2018-01-30

## 2018-01-30 VITALS
BODY MASS INDEX: 28.01 KG/M2 | DIASTOLIC BLOOD PRESSURE: 78 MMHG | OXYGEN SATURATION: 100 % | RESPIRATION RATE: 18 BRPM | HEIGHT: 69 IN | TEMPERATURE: 97.5 F | SYSTOLIC BLOOD PRESSURE: 124 MMHG | WEIGHT: 189.13 LBS | HEART RATE: 45 BPM

## 2018-01-30 DIAGNOSIS — G40.909 SEIZURE DISORDER (HCC): ICD-10-CM

## 2018-01-30 DIAGNOSIS — I10 ESSENTIAL HYPERTENSION: ICD-10-CM

## 2018-01-30 DIAGNOSIS — D12.6 ADENOMATOUS POLYP OF COLON, UNSPECIFIED PART OF COLON: ICD-10-CM

## 2018-01-30 DIAGNOSIS — E78.5 HYPERLIPIDEMIA LDL GOAL <100: ICD-10-CM

## 2018-01-30 DIAGNOSIS — E11.9 TYPE 2 DIABETES MELLITUS WITH HEMOGLOBIN A1C GOAL OF LESS THAN 7.0% (HCC): Primary | ICD-10-CM

## 2018-01-30 LAB — HBA1C MFR BLD HPLC: 5.5 %

## 2018-01-30 PROCEDURE — 36415 COLL VENOUS BLD VENIPUNCTURE: CPT

## 2018-01-30 PROCEDURE — 80061 LIPID PANEL: CPT

## 2018-01-30 PROCEDURE — 80048 BASIC METABOLIC PNL TOTAL CA: CPT

## 2018-01-30 RX ORDER — LACOSAMIDE 150 MG/1
150 TABLET ORAL 2 TIMES DAILY
Qty: 1 TAB | Refills: 1
Start: 2018-01-30

## 2018-01-30 NOTE — PROGRESS NOTES
HISTORY OF PRESENT ILLNESS  Josesito Ribeiro is a 78 y.o. male. HPI   Diabetes:  He is here for follow up of diabetes. Proteinuria: no  Neuropathy: no  Medication change since last visit:  No   Diabetic Review of Systems - medication compliance: compliant all of the time, diabetic diet compliance: compliant all of the time, home glucose monitoring: is not performed. Hypoglycemic symptoms: no  Dilated eye exam in the last year: no      Lab Results   Component Value Date/Time    Hemoglobin A1c 6.6 01/20/2015 12:02 PM    Hemoglobin A1c (POC) 5.5 01/30/2018 09:50 AM     Lab Results   Component Value Date/Time    Microalb/Creat ratio (ug/mg creat.) 5.1 07/20/2017 12:00 AM         Last Point of Care HGB A1C  Hemoglobin A1c (POC)   Date Value Ref Range Status   01/30/2018 5.5 % Final        Hypertension:  Josesito Ribeiro is a 78 y.o. male with hypertension. with Chronic kidney disease stage 2   Medication change since last visit: No  The patient reports:  taking medications as instructed, no medication side effects noted, home BP monitoring in range of 187'L systolic over 44'O diastolic, no chest pain on exertion, no dyspnea on exertion, no swelling of ankles, no orthostatic dizziness or lightheadedness, no palpitations. Lifestyle modification/social history: not attempting to follow a low fat, low cholesterol diet, not attempting to follow a low sodium diet, nonsmoker    Lab Results   Component Value Date/Time    Sodium 146 07/20/2017 12:00 AM    Potassium 4.6 07/20/2017 12:00 AM    Chloride 105 07/20/2017 12:00 AM    CO2 25 07/20/2017 12:00 AM    Anion gap 5 02/20/2012 11:00 AM    Glucose 76 07/20/2017 12:00 AM    BUN 15 07/20/2017 12:00 AM    Creatinine 1.13 07/20/2017 12:00 AM    BUN/Creatinine ratio 13 07/20/2017 12:00 AM    GFR est AA 74 07/20/2017 12:00 AM    GFR est non-AA 64 07/20/2017 12:00 AM    Calcium 9.4 07/20/2017 12:00 AM       Hyperlipidemia:  Josesito Ribeiro is following up on his dyslipidemia. Cardiovascular risks for him are: LDL goal is under 100. Currently he takes Lipitor (atorvastatin) ,   Lab Results   Component Value Date/Time    Cholesterol, total 141 12/01/2016 12:00 AM    Cholesterol, total 223 06/27/2016 03:46 AM    Cholesterol, total 160 07/30/2014 12:46 PM    Cholesterol, total 161 10/16/2013 11:32 AM    Cholesterol, total 213 11/26/2012 10:29 AM    HDL Cholesterol 51 12/01/2016 12:00 AM    HDL Cholesterol 60 06/27/2016 03:46 AM    HDL Cholesterol 70 07/30/2014 12:46 PM    HDL Cholesterol 65 10/16/2013 11:32 AM    HDL Cholesterol 58 11/26/2012 10:29 AM    LDL, calculated 75 12/01/2016 12:00 AM    LDL, calculated 141 06/27/2016 03:46 AM    LDL, calculated 81 07/30/2014 12:46 PM    LDL, calculated 84 10/16/2013 11:32 AM    LDL, calculated 132 11/26/2012 10:29 AM    Triglyceride 76 12/01/2016 12:00 AM    Triglyceride 110 06/27/2016 03:46 AM    Triglyceride 46 07/30/2014 12:46 PM    Triglyceride 62 10/16/2013 11:32 AM    Triglyceride 117 11/26/2012 10:29 AM    CHOL/HDL Ratio 3.2 04/01/2010 09:09 AM     Lab Results   Component Value Date/Time    ALT (SGPT) 21 12/01/2016 12:00 AM    AST (SGOT) 26 12/01/2016 12:00 AM    Alk. phosphatase 61 12/01/2016 12:00 AM    Bilirubin, total 0.8 12/01/2016 12:00 AM       Myalgias: no  Fatigue: no      Seizure disorder. Seeing Dr. Austin Tierney still. Increased vimpat but no recent seizures to report. ROS    Physical Exam   Constitutional: He appears well-developed and well-nourished. No distress. /78 (BP 1 Location: Left arm, BP Patient Position: Sitting)  Pulse (!) 45  Temp 97.5 °F (36.4 °C) (Oral)   Resp 18  Ht 5' 9\" (1.753 m)  Wt 189 lb 2 oz (85.8 kg)  SpO2 100%  BMI 27.93 kg/m2Body mass index is 27.93 kg/(m^2). HENT:   Mouth/Throat: Oropharynx is clear and moist.   Neck: No JVD present. Carotid bruit is not present. Cardiovascular: Normal rate, regular rhythm, normal heart sounds and intact distal pulses.     Pulmonary/Chest: Effort normal and breath sounds normal.   Musculoskeletal: He exhibits no edema. Neurological: He is alert. Skin: Skin is warm and dry. He is not diaphoretic. Nursing note and vitals reviewed. ASSESSMENT and PLAN  Diagnoses and all orders for this visit:    1. Type 2 diabetes mellitus with hemoglobin A1c goal of less than 7.0% (HCC) - Well controlled and stable. his medications were reviewed and refilled where necessary as noted below. Labs ordered as noted. -     AMB POC HEMOGLOBIN N0G  -     METABOLIC PANEL, BASIC    2. Adenomatous polyp of colon, unspecified part of colon -'  he was advised to have follow up colonoscopy in 2022      3. Seizure disorder (HCC)  -     lacosamide (VIMPAT) 150 mg tab tablet; Take 1 Tab by mouth two (2) times a day. Max Daily Amount: 300 mg.    4. Essential hypertension - Well controlled and stable. his medications were reviewed and refilled where necessary as noted below. Labs ordered as noted. -     METABOLIC PANEL, BASIC    5. Hyperlipidemia LDL goal <100 - recheck now. -     LIPID PANEL      Follow-up Disposition:  Return in about 6 months (around 7/30/2018).

## 2018-01-30 NOTE — MR AVS SNAPSHOT
[address] [place] [address] 
 
 
 [address] [place] [address] 
[phone] Patient: Lieutenant Pedraza MRN:  [id number] Visit Information Date & Time Provider Department Dept. Phone Encounter #  
 1/30/2018 10:00 AM Radha Rodríguez MD [facility] [address]  [id number] Follow-up Instructions Return in about 6 months (around 7/30/2018). Upcoming Health Maintenance Date Due DTaP/Tdap/Td series (1 - Tdap) 11/4/1963 EYE EXAM RETINAL OR DILATED Q1 12/20/2015 GLAUCOMA SCREENING Q2Y 11/1/2016 Influenza Age 5 to Adult 8/1/2017 FOOT EXAM Q1 9/20/2017 LIPID PANEL Q1 12/1/2017 HEMOGLOBIN A1C Q6M 1/20/2018 MICROALBUMIN Q1 7/20/2018 MEDICARE YEARLY EXAM 7/21/2018 COLONOSCOPY 10/23/2022 Allergies as of 1/30/2018  Review Complete On: 10/23/2017 By: Chavez Anderson RN No Known Allergies Current Immunizations  Reviewed on 10/12/2016 Name Date Influenza High Dose Vaccine PF 10/4/2016, 10/7/2015 Influenza Vaccine 10/5/2013 Influenza Vaccine (Whole Virus) 10/30/2012 Influenza Vaccine Split 9/26/2011 Pneumococcal Conjugate (PCV-13) 8/23/2016 Pneumococcal Polysaccharide (PPSV-23) 10/10/2013 Not reviewed this visit You Were Diagnosed With   
  
 Codes Comments Type 2 diabetes mellitus with hemoglobin A1c goal of less than 7.0% (HCC)    -  Primary ICD-10-CM: E11.9 ICD-9-CM: 250.00 Adenomatous polyp of colon, unspecified part of colon     ICD-10-CM: D12.6 ICD-9-CM: 211.3 Seizure disorder ([facility] [address]     ICD-10-CM: G40.909 ICD-9-CM: 345.90 Essential hypertension     ICD-10-CM: I10 
ICD-9-CM: 401.9 Hyperlipidemia LDL goal <100     ICD-10-CM: E78.5 ICD-9-CM: 272.4 Vitals BP Pulse Temp Resp Height(growth percentile) Weight(growth percentile)  124/78 (BP 1 Location: Left arm, BP Patient Position: Sitting) (!) 45 97.5 °F (36.4 °C) (Oral) 18 5' 9\" (1.753 m) 189 lb 2 oz (85.8 kg) SpO2 BMI Smoking Status 100% 27.93 kg/m2 Never Smoker Vitals History BMI and BSA Data Body Mass Index Body Surface Area  
 27.93 kg/m 2 2.04 m 2 Preferred Pharmacy Pharmacy Name Phone [facility] [phone] Your Updated Medication List  
  
   
This list is accurate as of: 1/30/18 10:12 AM.  Always use your most recent med list.  
  
  
  
  
 ASPIR-81 81 mg tablet Generic drug:  aspirin delayed-release Take  by mouth daily. atorvastatin 10 mg tablet Commonly known as:  LIPITOR  
TAKE ONE TABLET BY MOUTH DAILY  
  
 KEPPRA 1,000 mg tablet Generic drug:  levETIRAcetam  
1 in am 2 in pm  Indications: TONIC-CLONIC EPILEPSY TREATMENT ADJUNCT  
  
 lacosamide 150 mg Tab tablet Commonly known as:  VIMPAT Take 1 Tab by mouth two (2) times a day. Max Daily Amount: 300 mg.  
  
 metFORMIN  mg tablet Commonly known as:  GLUCOPHAGE XR  
TAKE ONE TABLET BY MOUTH DAILY WITH DINNER  
  
 valsartan 160 mg tablet Commonly known as:  DIOVAN  
TAKE 1 TABLET DAILY VIAGRA 100 mg tablet Generic drug:  sildenafil citrate TAKE 1 TABLET BY MOUTH AS NEEDED  
  
 VITAMIN D2 50,000 unit capsule Generic drug:  ergocalciferol TAKE ONE CAPSULE BY MOUTH EVERY 7 DAYS We Performed the Following AMB POC HEMOGLOBIN A1C [12286 CPT(R)] LIPID PANEL [44831 CPT(R)] METABOLIC PANEL, BASIC [07896 CPT(R)] Follow-up Instructions Return in about 6 months (around 7/30/2018). Introducing [facility] & [facility]! Kesha Mix introduces [employer] patient portal. Now you can access parts of your medical record, email your doctor's office, and request medication refills online. 1. In your internet browser, go to [email or web address] 2. Click on the First Time User? Click Here link in the Sign In box. You will see the New Member Sign Up page. 3. Enter your [employer] Access Code exactly as it appears below. You will not need to use this code after youve completed the sign-up process. If you do not sign up before the expiration date, you must request a new code. · [employer] Access Code: [email or web address] Expires: 4/30/2018 10:12 AM 
 
4. Enter the last four digits of your Social Security Number (xxxx) and Date of Birth (mm/dd/yyyy) as indicated and click Submit. You will be taken to the next sign-up page. 5. Create a [employer] ID. This will be your [employer] login ID and cannot be changed, so think of one that is secure and easy to remember. 6. Create a [employer] password. You can change your password at any time. 7. Enter your Password Reset Question and Answer. This can be used at a later time if you forget your password. 8. Enter your e-mail address. You will receive e-mail notification when new information is available in [address] 9. Click Sign Up. You can now view and download portions of your medical record. 10. Click the Download Summary menu link to download a portable copy of your medical information. If you have questions, please visit the Frequently Asked Questions section of the [employer] website. Remember, [employer] is NOT to be used for urgent needs. For medical emergencies, dial 911. Now available from your iPhone and Android! Please provide this summary of care documentation to your next provider. Your primary care clinician is listed as Anh Szymanski. If you have any questions after today's visit, please call [phone].

## 2018-01-31 LAB
BUN SERPL-MCNC: 16 MG/DL (ref 8–27)
BUN/CREAT SERPL: 15 (ref 10–24)
CALCIUM SERPL-MCNC: 9.6 MG/DL (ref 8.6–10.2)
CHLORIDE SERPL-SCNC: 103 MMOL/L (ref 96–106)
CHOLEST SERPL-MCNC: 140 MG/DL (ref 100–199)
CO2 SERPL-SCNC: 27 MMOL/L (ref 18–29)
CREAT SERPL-MCNC: 1.07 MG/DL (ref 0.76–1.27)
GFR SERPLBLD CREATININE-BSD FMLA CKD-EPI: 68 ML/MIN/1.73
GFR SERPLBLD CREATININE-BSD FMLA CKD-EPI: 78 ML/MIN/1.73
GLUCOSE SERPL-MCNC: 86 MG/DL (ref 65–99)
HDLC SERPL-MCNC: 51 MG/DL
INTERPRETATION, 910389: NORMAL
LDLC SERPL CALC-MCNC: 70 MG/DL (ref 0–99)
Lab: NORMAL
POTASSIUM SERPL-SCNC: 4.8 MMOL/L (ref 3.5–5.2)
SODIUM SERPL-SCNC: 145 MMOL/L (ref 134–144)
TRIGL SERPL-MCNC: 96 MG/DL (ref 0–149)
VLDLC SERPL CALC-MCNC: 19 MG/DL (ref 5–40)

## 2018-02-27 RX ORDER — ERGOCALCIFEROL 1.25 MG/1
CAPSULE ORAL
Qty: 12 CAP | Refills: 0 | Status: SHIPPED | OUTPATIENT
Start: 2018-02-27 | End: 2018-10-20 | Stop reason: SDUPTHER

## 2018-04-24 ENCOUNTER — OFFICE VISIT (OUTPATIENT)
Dept: INTERNAL MEDICINE CLINIC | Age: 78
End: 2018-04-24

## 2018-04-24 VITALS
RESPIRATION RATE: 18 BRPM | SYSTOLIC BLOOD PRESSURE: 135 MMHG | OXYGEN SATURATION: 99 % | HEART RATE: 56 BPM | WEIGHT: 182.13 LBS | TEMPERATURE: 97.8 F | BODY MASS INDEX: 26.97 KG/M2 | HEIGHT: 69 IN | DIASTOLIC BLOOD PRESSURE: 84 MMHG

## 2018-04-24 DIAGNOSIS — R41.3 MEMORY PROBLEM: Primary | ICD-10-CM

## 2018-04-24 NOTE — MR AVS SNAPSHOT
[facility] 
 
 
 [address] [place] [address] [facility] 
[phone] Patient: Reba Gifford MRN:  [id number] Visit Information Date & Time Provider Department Dept. Phone Encounter #  
 4/24/2018  3:00 PM Bettina Morris MD [facility] [address] [id number] Follow-up Instructions Return in about 3 months (around 7/24/2018). Upcoming Health Maintenance Date Due DTaP/Tdap/Td series (1 - Tdap) 11/4/1961 EYE EXAM RETINAL OR DILATED Q1 12/20/2015 GLAUCOMA SCREENING Q2Y 11/1/2016 Influenza Age 5 to Adult 8/1/2017 FOOT EXAM Q1 9/20/2017 MICROALBUMIN Q1 7/20/2018 MEDICARE YEARLY EXAM 7/21/2018 HEMOGLOBIN A1C Q6M 7/30/2018 LIPID PANEL Q1 1/30/2019 COLONOSCOPY 10/23/2022 Allergies as of 4/24/2018  Review Complete On: 4/24/2018 By: Josey Leo LPN No Known Allergies Current Immunizations  Reviewed on 10/12/2016 Name Date Influenza High Dose Vaccine PF 10/4/2016, 10/7/2015 Influenza Vaccine 10/5/2013 Influenza Vaccine (Whole Virus) 10/30/2012 Influenza Vaccine Split 9/26/2011 Pneumococcal Conjugate (PCV-13) 8/23/2016 Pneumococcal Polysaccharide (PPSV-23) 10/10/2013 Not reviewed this visit Vitals BP Pulse Temp Resp Height(growth percentile) Weight(growth percentile) 135/84 (BP 1 Location: Left arm, BP Patient Position: Sitting) (!) 56 97.8 °F (36.6 °C) (Oral) 18 5' 9\" (1.753 m) 182 lb 2 oz (82.6 kg) SpO2 BMI Smoking Status 99% 26.9 kg/m2 Never Smoker BMI and BSA Data Body Mass Index Body Surface Area  
 26.9 kg/m 2 2.01 m 2 Preferred Pharmacy Pharmacy Name Phone [facility], [address] [phone] Your Updated Medication List  
  
   
This list is accurate as of 4/24/18  3:31 PM.  Always use your most recent med list.  
  
  
  
  
 ASPIR-81 81 mg tablet Generic drug:  aspirin delayed-release Take  by mouth daily. atorvastatin 10 mg tablet Commonly known as:  LIPITOR  
TAKE ONE TABLET BY MOUTH DAILY  
  
 KEPPRA 1,000 mg tablet Generic drug:  levETIRAcetam  
1 in am 2 in pm  Indications: TONIC-CLONIC EPILEPSY TREATMENT ADJUNCT  
  
 lacosamide 150 mg Tab tablet Commonly known as:  VIMPAT Take 1 Tab by mouth two (2) times a day. Max Daily Amount: 300 mg.  
  
 metFORMIN  mg tablet Commonly known as:  GLUCOPHAGE XR  
TAKE ONE TABLET BY MOUTH DAILY WITH DINNER  
  
 valsartan 160 mg tablet Commonly known as:  DIOVAN  
TAKE 1 TABLET DAILY VIAGRA 100 mg tablet Generic drug:  sildenafil citrate TAKE 1 TABLET BY MOUTH AS NEEDED  
  
 VITAMIN D2 50,000 unit capsule Generic drug:  ergocalciferol TAKE ONE CAPSULE BY MOUTH EVERY 7 DAYS Follow-up Instructions Return in about 3 months (around 7/24/2018). Introducing [facility]! Mert Flores introduces [employer] patient portal. Now you can access parts of your medical record, email your doctor's office, and request medication refills online. 1. In your internet browser, go to [email or web address] 2. Click on the First Time User? Click Here link in the Sign In box. You will see the New Member Sign Up page. 3. Enter your [employer] Access Code exactly as it appears below. You will not need to use this code after youve completed the sign-up process. If you do not sign up before the expiration date, you must request a new code. · [employer] Access Code: [email or web address] Expires: 4/30/2018 11:12 AM 
 
4. Enter the last four digits of your Social Security Number (xxxx) and Date of Birth (mm/dd/yyyy) as indicated and click Submit. You will be taken to the next sign-up page. 5. Create a [employer] ID. This will be your [employer] login ID and cannot be changed, so think of one that is secure and easy to remember. 6. Create a [employer] password. You can change your password at any time. 7. Enter your Password Reset Question and Answer. This can be used at a later time if you forget your password. 8. Enter your e-mail address. You will receive e-mail notification when new information is available in [address] 9. Click Sign Up. You can now view and download portions of your medical record. 10. Click the Download Summary menu link to download a portable copy of your medical information. If you have questions, please visit the Frequently Asked Questions section of the [employer] website. Remember, [employer] is NOT to be used for urgent needs. For medical emergencies, dial 911. Now available from your iPhone and Android! Please provide this summary of care documentation to your next provider. Your primary care clinician is listed as Aruna Villarreal. If you have any questions after today's visit, please call [phone].

## 2018-04-24 NOTE — PROGRESS NOTES
HISTORY OF PRESENT ILLNESS  Luh Riggins is a 78 y.o. male. HPI  His neurologist is working up memory problems. To have MRI and labs. Not clear why he is here - he just thought he should see his pcp also. He reports some short term memory problems. He has family hx of alzheimers in his father. Dr. Jenn King has ordered full labs and MRI head to be done this week. Brief cognitive testing done per pt. ? Result. ? Plans for neuropsych testing. No other new problems. No seizures while on medications. .    DM, HTN and lipids have been well controlled based on visit/ labs in late Jan here. Patient Active Problem List   Diagnosis Code    Seizure disorder ([facility] [address] G40.909    ED (erectile dysfunction) N52.9    Bilateral inguinal hernia K40.20    Appendicitis K37    Advanced care planning/counseling discussion Z71.89    Type 2 diabetes mellitus with hemoglobin A1c goal of less than 7.0% (HCC) E11.9    Hyperlipidemia LDL goal <100 E78.5    Essential hypertension I10    Adenomatous polyp of colon D12.6     Past Medical History:   Diagnosis Date    Appendicitis 9/13/2012    ED (erectile dysfunction) 2/18/2009    HTN 2/18/2009    Hypercholesterolemia 2/18/2009    Seizure disorder ([facility] [address] 2/18/2009    last seizure 4-2011 focal    Stroke [facility])     hx  old lacunar infarct     No Known Allergies  Current Outpatient Prescriptions on File Prior to Visit   Medication Sig Dispense Refill    VITAMIN D2 50,000 unit capsule TAKE ONE CAPSULE BY MOUTH EVERY 7 DAYS 12 Cap 0    lacosamide (VIMPAT) 150 mg tab tablet Take 1 Tab by mouth two (2) times a day. Max Daily Amount: 300 mg. 1 Tab 1    metFORMIN ER (GLUCOPHAGE XR) 500 mg tablet TAKE ONE TABLET BY MOUTH DAILY WITH DINNER 30 Tab 5    atorvastatin (LIPITOR) 10 mg tablet TAKE ONE TABLET BY MOUTH DAILY 30 Tab 5    valsartan (DIOVAN) 160 mg tablet TAKE 1 TABLET DAILY 90 Tab 1    aspirin delayed-release (ASPIR-81) 81 mg tablet Take  by mouth daily.       KEPPRA 1,000 mg tablet 1 in am  2 in pm  Indications: TONIC-CLONIC EPILEPSY TREATMENT ADJUNCT      VIAGRA 100 mg tablet TAKE 1 TABLET BY MOUTH AS NEEDED 6 Tab 5     No current facility-administered medications on file prior to visit. Social History   Substance Use Topics    Smoking status: Never Smoker    Smokeless tobacco: Never Used    Alcohol use No       .    ROS    Physical Exam   Psychiatric: His speech is normal and behavior is normal. Judgment and thought content normal. His mood appears not anxious. His affect is blunt. Cognition and memory are normal. He does not exhibit a depressed mood. Visit Vitals    /84 (BP 1 Location: Left arm, BP Patient Position: Sitting)    Pulse (!) 56    Temp 97.8 °F (36.6 °C) (Oral)    Resp 18    Ht 5' 9\" (1.753 m)    Wt 182 lb 2 oz (82.6 kg)    SpO2 99%    BMI 26.9 kg/m2       ASSESSMENT and PLAN  Diagnoses and all orders for this visit:    1. Memory problem - checking thyroid, B12, CBC, BMP, head MRI with neurology. ? neuropsych testing after that seems reasonable. Will defer to Dr. Jacek Ye. Pt advised to comply with workup/ follow up. See me back in 3 months. Follow-up Disposition:  Return in about 3 months (around 7/24/2018).

## 2018-07-24 ENCOUNTER — HOSPITAL ENCOUNTER (OUTPATIENT)
Dept: LAB | Age: 78
Discharge: HOME | End: 2018-07-24
Payer: MEDICARE

## 2018-07-24 ENCOUNTER — OFFICE VISIT (OUTPATIENT)
Dept: INTERNAL MEDICINE CLINIC | Age: 78
End: 2018-07-24

## 2018-07-24 VITALS
RESPIRATION RATE: 18 BRPM | BODY MASS INDEX: 27.55 KG/M2 | WEIGHT: 186 LBS | HEIGHT: 69 IN | DIASTOLIC BLOOD PRESSURE: 75 MMHG | HEART RATE: 65 BPM | SYSTOLIC BLOOD PRESSURE: 138 MMHG | TEMPERATURE: 97.6 F | OXYGEN SATURATION: 99 %

## 2018-07-24 DIAGNOSIS — M19.91 PRIMARY OSTEOARTHRITIS, UNSPECIFIED SITE: ICD-10-CM

## 2018-07-24 DIAGNOSIS — Z71.89 ADVANCED CARE PLANNING/COUNSELING DISCUSSION: ICD-10-CM

## 2018-07-24 DIAGNOSIS — Z00.00 MEDICARE ANNUAL WELLNESS VISIT, SUBSEQUENT: Primary | ICD-10-CM

## 2018-07-24 DIAGNOSIS — I10 ESSENTIAL HYPERTENSION: ICD-10-CM

## 2018-07-24 DIAGNOSIS — R41.3 MEMORY PROBLEM: ICD-10-CM

## 2018-07-24 DIAGNOSIS — E11.9 TYPE 2 DIABETES MELLITUS WITH HEMOGLOBIN A1C GOAL OF LESS THAN 7.0% (HCC): ICD-10-CM

## 2018-07-24 DIAGNOSIS — Z13.31 SCREENING FOR DEPRESSION: ICD-10-CM

## 2018-07-24 LAB — HBA1C MFR BLD HPLC: 5.5 %

## 2018-07-24 PROCEDURE — 36415 COLL VENOUS BLD VENIPUNCTURE: CPT

## 2018-07-24 PROCEDURE — 80048 BASIC METABOLIC PNL TOTAL CA: CPT

## 2018-07-24 RX ORDER — ACETAMINOPHEN 500 MG
1000 TABLET ORAL 3 TIMES DAILY
COMMUNITY
Start: 2018-07-24 | End: 2022-02-02 | Stop reason: ALTCHOICE

## 2018-07-24 RX ORDER — ASPIRIN 325 MG
325 TABLET ORAL
COMMUNITY
End: 2018-07-24 | Stop reason: ALTCHOICE

## 2018-07-24 NOTE — PROGRESS NOTES
HISTORY OF PRESENT ILLNESS  Trinidad Pérez is a 78 y.o. male. HPI  Diabetes:  He is here for follow up of diabetes. Proteinuria: no  Neuropathy: yes  Medication change since last visit:  No   Diabetic Review of Systems - medication compliance: compliant all of the time, diabetic diet compliance: compliant all of the time, home glucose monitoring: is not performed. Hypoglycemic symptoms: no        Lab Results   Component Value Date/Time    Hemoglobin A1c 6.6 (H) 01/20/2015 12:02 PM    Hemoglobin A1c (POC) 5.5 07/24/2018 03:30 AM     Lab Results   Component Value Date/Time    Microalb/Creat ratio (ug/mg creat.) 5.1 07/20/2017 12:00 AM         Last Point of Care HGB A1C  Hemoglobin A1c (POC)   Date Value Ref Range Status   07/24/2018 5.5 % Final        Hypertension:  Trinidad Pérez is a 78 y.o. male with hypertension. with Chronic kidney disease stage 2   Medication change since last visit: No  The patient reports:  taking medications as instructed, no medication side effects noted, no chest pain on exertion, no dyspnea on exertion, no swelling of ankles, no orthostatic dizziness or lightheadedness, no palpitations. Lifestyle modification/social history: generally follows a low fat low cholesterol diet, sedentary, nonsmoker    Lab Results   Component Value Date/Time    Sodium 145 (H) 01/30/2018 10:29 AM    Potassium 4.8 01/30/2018 10:29 AM    Chloride 103 01/30/2018 10:29 AM    CO2 27 01/30/2018 10:29 AM    Anion gap 5 02/20/2012 11:00 AM    Glucose 86 01/30/2018 10:29 AM    BUN 16 01/30/2018 10:29 AM    Creatinine 1.07 01/30/2018 10:29 AM    BUN/Creatinine ratio 15 01/30/2018 10:29 AM    GFR est AA 78 01/30/2018 10:29 AM    GFR est non-AA 68 01/30/2018 10:29 AM    Calcium 9.6 01/30/2018 10:29 AM       He reports ongoing memory / cognitive problems. He's had workup by Dr. Cuco Sanford recently. .  Notes reviewed. He's not had neuropsych testing yet. To follow up with DR. Yip in Oct.    No reported seizures on medications    Chronic R hip and knee aching. No injuries. He's used ibuprofen some for this. No swelling, redness. Patient Active Problem List   Diagnosis Code    Seizure disorder ([facility] [address] G40.909    ED (erectile dysfunction) N52.9    Bilateral inguinal hernia K40.20    Appendicitis K37    Advanced care planning/counseling discussion Z71.89    Type 2 diabetes mellitus with hemoglobin A1c goal of less than 7.0% (HCC) E11.9    Hyperlipidemia LDL goal <100 E78.5    Essential hypertension I10    Adenomatous polyp of colon D12.6    Memory problem R41.3     Past Medical History:   Diagnosis Date    Appendicitis 9/13/2012    ED (erectile dysfunction) 2/18/2009    HTN 2/18/2009    Hypercholesterolemia 2/18/2009    Seizure disorder ([facility] [place] [address] 2/18/2009    last seizure 4-2011 focal    Stroke [facility])     hx  old lacunar infarct     No Known Allergies  Current Outpatient Prescriptions on File Prior to Visit   Medication Sig Dispense Refill    VITAMIN D2 50,000 unit capsule TAKE ONE CAPSULE BY MOUTH EVERY 7 DAYS 12 Cap 0    lacosamide (VIMPAT) 150 mg tab tablet Take 1 Tab by mouth two (2) times a day. Max Daily Amount: 300 mg. 1 Tab 1    metFORMIN ER (GLUCOPHAGE XR) 500 mg tablet TAKE ONE TABLET BY MOUTH DAILY WITH DINNER 30 Tab 5    atorvastatin (LIPITOR) 10 mg tablet TAKE ONE TABLET BY MOUTH DAILY 30 Tab 5    VIAGRA 100 mg tablet TAKE 1 TABLET BY MOUTH AS NEEDED 6 Tab 5    valsartan (DIOVAN) 160 mg tablet TAKE 1 TABLET DAILY 90 Tab 1    aspirin delayed-release (ASPIR-81) 81 mg tablet Take 81 mg by mouth nightly.  KEPPRA 1,000 mg tablet 1 in am  2 in pm  Indications: TONIC-CLONIC EPILEPSY TREATMENT ADJUNCT       No current facility-administered medications on file prior to visit.       Social History   Substance Use Topics    Smoking status: Never Smoker    Smokeless tobacco: Never Used    Alcohol use No             ROS    Physical Exam   Constitutional: He appears well-developed and well-nourished. No distress. /75 (BP 1 Location: Left arm, BP Patient Position: Sitting)  Pulse 65  Temp 97.6 °F (36.4 °C) (Oral)   Resp 18  Ht 5' 9\" (1.753 m)  Wt 186 lb (84.4 kg)  SpO2 99%  BMI 27.47 kg/m2Body mass index is 27.47 kg/(m^2). HENT:   Mouth/Throat: Oropharynx is clear and moist.   Neck: No JVD present. Carotid bruit is not present. Cardiovascular: Normal rate, regular rhythm, normal heart sounds and intact distal pulses. Pulmonary/Chest: Effort normal and breath sounds normal.   Musculoskeletal: He exhibits no edema. Neurological: He is alert. Skin: Skin is warm and dry. He is not diaphoretic. Psychiatric: His speech is normal and behavior is normal. Thought content normal. His mood appears not anxious. His affect is blunt. Cognition and memory are impaired. He does not exhibit a depressed mood. Nursing note and vitals reviewed. ASSESSMENT and PLAN  Diagnoses and all orders for this visit:    1. Medicare annual wellness visit, subsequent  Hanny Preston received a complete medicare wellness assessment today by Odilon Larson RN. I've reviewed her assessment note and all screening/preventative plans addressed. I also addressed all questions and concerns surrounding the assessment and plans with Hanny Preston. 2. Type 2 diabetes mellitus with hemoglobin A1c goal of less than 7.0% ([facility]) -Well controlled and stable. his medications were reviewed and refilled where necessary as noted below. Labs ordered as noted. -     AMB POC HEMOGLOBIN A1C  -     MICROALBUMIN, UR, RAND W/ MICROALB/CREAT RATIO    3. Memory problem - refer for neuropsych testing. ? Namenda/ aricept?  -     REFERRAL TO NEUROPSYCHOLOGY    4. Essential hypertension - fairly controlled and stable. his medications were reviewed and refilled where necessary as noted below. Labs ordered as noted. -     METABOLIC PANEL, BASIC    5.  Primary osteoarthritis, unspecified site  Change to higher dose tylenol for now      Follow-up Disposition: Not on File

## 2018-07-24 NOTE — MR AVS SNAPSHOT
[address] [place] [address] 
 
 
 [address] [place] [address] 
[phone] Patient: Margit Jeans MRN:  [facility]:[id number] Visit Information Date & Time Provider Department Dept. Phone Encounter #  
 7/24/2018  3:15 PM Zoey Connor MD [facility] [address] [id number] Upcoming Health Maintenance Date Due DTaP/Tdap/Td series (1 - Tdap) 11/4/1961 EYE EXAM RETINAL OR DILATED Q1 12/20/2015 GLAUCOMA SCREENING Q2Y 11/1/2016 FOOT EXAM Q1 9/20/2017 MICROALBUMIN Q1 7/20/2018 MEDICARE YEARLY EXAM 7/21/2018 HEMOGLOBIN A1C Q6M 7/30/2018 Influenza Age 5 to Adult 8/1/2018 LIPID PANEL Q1 1/30/2019 COLONOSCOPY 10/23/2022 Allergies as of 7/24/2018  Review Complete On: 7/24/2018 By: Yuliana Ruiz LPN No Known Allergies Current Immunizations  Reviewed on 10/12/2016 Name Date Influenza High Dose Vaccine PF 9/13/2017, 10/4/2016, 10/7/2015 Influenza Vaccine 10/5/2013 Influenza Vaccine (Whole Virus) 10/30/2012 Influenza Vaccine Split 9/26/2011 Pneumococcal Conjugate (PCV-13) 8/23/2016 Pneumococcal Polysaccharide (PPSV-23) 10/10/2013 Not reviewed this visit You Were Diagnosed With   
  
 Codes Comments Type 2 diabetes mellitus with hemoglobin A1c goal of less than 7.0% (HCC)    -  Primary ICD-10-CM: E11.9 ICD-9-CM: 250.00 Memory problem     ICD-10-CM: R41.3 ICD-9-CM: 780.93 Essential hypertension     ICD-10-CM: I10 
ICD-9-CM: 401.9 Primary osteoarthritis, unspecified site     ICD-10-CM: M19.91 
ICD-9-CM: 715.10 Vitals BP Pulse Temp Resp Height(growth percentile) Weight(growth percentile) 138/75 (BP 1 Location: Left arm, BP Patient Position: Sitting) 65 97.6 °F (36.4 °C) (Oral) 18 5' 9\" (1.753 m) 186 lb (84.4 kg) SpO2 BMI Smoking Status 99% 27.47 kg/m2 Never Smoker Vitals History BMI and BSA Data Body Mass Index Body Surface Area  
 27.47 kg/m 2 2.03 m 2 Preferred Pharmacy Pharmacy Name Phone [facility] [phone] Your Updated Medication List  
  
   
This list is accurate as of 7/24/18  4:05 PM.  Always use your most recent med list.  
  
  
  
  
 acetaminophen 500 mg tablet Commonly known as:  TYLENOL Take 2 Tabs by mouth three (3) times daily. Indications: Arthritic Pain ASPIR-81 81 mg tablet Generic drug:  aspirin delayed-release Take 81 mg by mouth nightly. atorvastatin 10 mg tablet Commonly known as:  LIPITOR  
TAKE ONE TABLET BY MOUTH DAILY  
  
 KEPPRA 1,000 mg tablet Generic drug:  levETIRAcetam  
1 in am 2 in pm  Indications: TONIC-CLONIC EPILEPSY TREATMENT ADJUNCT  
  
 lacosamide 150 mg Tab tablet Commonly known as:  VIMPAT Take 1 Tab by mouth two (2) times a day. Max Daily Amount: 300 mg.  
  
 metFORMIN  mg tablet Commonly known as:  GLUCOPHAGE XR  
TAKE ONE TABLET BY MOUTH DAILY WITH DINNER  
  
 valsartan 160 mg tablet Commonly known as:  DIOVAN  
TAKE 1 TABLET DAILY VIAGRA 100 mg tablet Generic drug:  sildenafil citrate TAKE 1 TABLET BY MOUTH AS NEEDED  
  
 VITAMIN D2 50,000 unit capsule Generic drug:  ergocalciferol TAKE ONE CAPSULE BY MOUTH EVERY 7 DAYS We Performed the Following AMB POC HEMOGLOBIN A1C [06793 CPT(R)] METABOLIC PANEL, BASIC [60731 CPT(R)] MICROALBUMIN, UR, RAND W/ MICROALB/CREAT RATIO [id number] CPT(R)] REFERRAL TO NEUROPSYCHOLOGY [TFW77 Custom] Referral Information Referral ID Referred By Referred To  
  
 [id number] Yazmin CLEMENS 104, PsyD [place] [address] [facility] [address] [facility], [address] [facility] Phone: [phone] Fax: [phone] Visits Status Start Date End Date 1 New Request 7/24/18 7/24/19 If your referral has a status of pending review or denied, additional information will be sent to support the outcome of this decision. Introducing [facility]! Jose Swann introduces [employer] patient portal. Now you can access parts of your medical record, email your doctor's office, and request medication refills online. 1. In your internet browser, go to [email or web address] 2. Click on the First Time User? Click Here link in the Sign In box. You will see the New Member Sign Up page. 3. Enter your [employer] Access Code exactly as it appears below. You will not need to use this code after youve completed the sign-up process. If you do not sign up before the expiration date, you must request a new code. · [employer] Access Code: [email or web address] Expires: 10/22/2018  4:05 PM 
 
4. Enter the last four digits of your Social Security Number (xxxx) and Date of Birth (mm/dd/yyyy) as indicated and click Submit. You will be taken to the next sign-up page. 5. Create a [employer] ID. This will be your [employer] login ID and cannot be changed, so think of one that is secure and easy to remember. 6. Create a [employer] password. You can change your password at any time. 7. Enter your Password Reset Question and Answer. This can be used at a later time if you forget your password. 8. Enter your e-mail address. You will receive e-mail notification when new information is available in [address] 9. Click Sign Up. You can now view and download portions of your medical record. 10. Click the Download Summary menu link to download a portable copy of your medical information. If you have questions, please visit the Frequently Asked Questions section of the [employer] website. Remember, [employer] is NOT to be used for urgent needs. For medical emergencies, dial 911. Now available from your iPhone and Android! Please provide this summary of care documentation to your next provider. Your primary care clinician is listed as Sean Cedeno. If you have any questions after today's visit, please call [phone].

## 2018-07-25 LAB
BUN SERPL-MCNC: 14 MG/DL (ref 8–27)
BUN/CREAT SERPL: 14 (ref 10–24)
CALCIUM SERPL-MCNC: 9.3 MG/DL (ref 8.6–10.2)
CHLORIDE SERPL-SCNC: 103 MMOL/L (ref 96–106)
CO2 SERPL-SCNC: 24 MMOL/L (ref 20–29)
CREAT SERPL-MCNC: 1.03 MG/DL (ref 0.76–1.27)
GLUCOSE SERPL-MCNC: 85 MG/DL (ref 65–99)
POTASSIUM SERPL-SCNC: 4 MMOL/L (ref 3.5–5.2)
SODIUM SERPL-SCNC: 143 MMOL/L (ref 134–144)

## 2018-07-25 NOTE — PROGRESS NOTES
Nurse Navigator Medicare Wellness Visit performed by AN Siddiqui RN    This is the Subsequent Medicare Annual Wellness Exam, performed 12 months or more after the Initial AWV or the last Subsequent AWV    I have reviewed the patient's medical history in detail and updated the computerized patient record. History     Past Medical History:   Diagnosis Date    Appendicitis 9/13/2012    ED (erectile dysfunction) 2/18/2009    HTN 2/18/2009    Hypercholesterolemia 2/18/2009    Seizure disorder ([address] 2/18/2009    last seizure 4-2011 focal    Stroke [facility])     hx  old lacunar infarct      Past Surgical History:   Procedure Laterality Date    COLONOSCOPY  2002    COLONOSCOPY N/A 10/23/2017    COLONOSCOPY performed by Davion Jason MD at [address] [facility] HX APPENDECTOMY  2012    HX HERNIA REPAIR  2011     Current Outpatient Prescriptions   Medication Sig Dispense Refill    acetaminophen (TYLENOL) 500 mg tablet Take 2 Tabs by mouth three (3) times daily. Indications: Arthritic Pain      VITAMIN D2 50,000 unit capsule TAKE ONE CAPSULE BY MOUTH EVERY 7 DAYS 12 Cap 0    lacosamide (VIMPAT) 150 mg tab tablet Take 1 Tab by mouth two (2) times a day. Max Daily Amount: 300 mg. 1 Tab 1    metFORMIN ER (GLUCOPHAGE XR) 500 mg tablet TAKE ONE TABLET BY MOUTH DAILY WITH DINNER 30 Tab 5    atorvastatin (LIPITOR) 10 mg tablet TAKE ONE TABLET BY MOUTH DAILY 30 Tab 5    VIAGRA 100 mg tablet TAKE 1 TABLET BY MOUTH AS NEEDED 6 Tab 5    valsartan (DIOVAN) 160 mg tablet TAKE 1 TABLET DAILY 90 Tab 1    aspirin delayed-release (ASPIR-81) 81 mg tablet Take 81 mg by mouth nightly.       KEPPRA 1,000 mg tablet 1 in am  2 in pm  Indications: TONIC-CLONIC EPILEPSY TREATMENT ADJUNCT       No Known Allergies  Family History   Problem Relation Age of Onset    Hypertension Mother     Stroke Mother    [facility] Dementia Father     Hypertension Sister     Hypertension Brother     Diabetes Other     Cancer Neg Hx      Social History Substance Use Topics    Smoking status: Never Smoker    Smokeless tobacco: Never Used    Alcohol use No     Patient Active Problem List   Diagnosis Code    Seizure disorder ([facility] [address] G40.909    ED (erectile dysfunction) N52.9    Bilateral inguinal hernia K40.20    Appendicitis K37    Advanced care planning/counseling discussion Z71.89    Type 2 diabetes mellitus with hemoglobin A1c goal of less than 7.0% (HCC) E11.9    Hyperlipidemia LDL goal <100 E78.5    Essential hypertension I10    Adenomatous polyp of colon D12.6    Memory problem R41.3       Depression Risk Factor Screening:     PHQ over the last two weeks 7/24/2018   Little interest or pleasure in doing things Not at all   Feeling down, depressed, irritable, or hopeless Not at all   Total Score PHQ 2 0     Alcohol Risk Factor Screening: You do not drink alcohol or very rarely. Functional Ability and Level of Safety:   Hearing Loss  Hearing is good. Activities of Daily Living  The home contains: grab bars  Patient needs help with:  transportation and managing money   Partial assistance. Patient states that he lives with his wife in a private residence. 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 Patient states that he is independent in all ADL's, but he does not drive due to a history of seizures. Patient states that he employs a , so he is able to get out of the house on his own. Patient denies the use of assistive devices for ambulation. Patient states that he engages in regular exercise, but he often suffers from chronic fatigue due to his medications (patient states that PCP is aware). Patient shares that his mother-in-law & wife's aunt will be moving into their house in the near future. Patient was is good spirits.    ADL Assessment 7/25/2018   Feeding yourself No Help Needed   Getting from bed to chair No Help Needed   Getting dressed No Help Needed   Bathing or showering No Help Needed   Walk across the room (includes cane/walker) No Help Needed   Using the telphone No Help Needed   Taking your medications No Help Needed   Preparing meals No Help Needed   Managing money (expenses/bills) Help Needed   Moderately strenuous housework (laundry) No Help Needed   Shopping for personal items (toiletries/medicines) No Help Needed   Shopping for groceries No Help Needed   Driving Help Needed   Climbing a flight of stairs No Help Needed   Getting to places beyond walking distances Help Needed     Patient denies falls within the past year & verbalizes awareness of fall prevention strategies. Fall Risk  Fall Risk Assessment, last 12 mths 7/24/2018   Able to walk? Yes   Fall in past 12 months? No       Abuse Screen  Patient is not abused   Abuse Screening Questionnaire 7/25/2018   Do you ever feel afraid of your partner? N   Are you in a relationship with someone who physically or mentally threatens you? N   Is it safe for you to go home? Y       Cognitive Screening   Evaluation of Cognitive Function:  Please refer to PCP's progress note for additional information regarding patient's memory. Today, PCP provided patient with a referral to neuropsychology as patient states [facility]    Patient Care Team   Patient Care Team:  Paulette Estrada MD as PCP - General (Internal Medicine)    Assessment/Plan   Education and counseling provided:  Are appropriate based on today's review and evaluation  End-of-Life planning (with patient's consent)  Pneumococcal Vaccine  Influenza Vaccine  Prostate cancer screening tests (PSA, covered annually)  Colorectal cancer screening tests  Screening for glaucoma  tdap & shingles vaccinations    Diagnoses and all orders for this visit:    1. Medicare annual wellness visit, subsequent    2. Type 2 diabetes mellitus with hemoglobin A1c goal of less than 7.0% ([facility])  -     AMB POC HEMOGLOBIN A1C  -     MICROALBUMIN, UR, RAND W/ MICROALB/CREAT RATIO    3. Memory problem  -     REFERRAL TO NEUROPSYCHOLOGY    4. Essential hypertension  -     METABOLIC PANEL, BASIC    5. Primary osteoarthritis, unspecified site    AWV Summary:    1. Patient states that a completed Advanced Medical Directive is at home. NN encouraged patient to bring a copy of the completed Advanced Medical Directive to the office for scanning into the medical record. Patient verbalized understanding & agreement. 2. Patient is up to date on the following immunizations:  Immunization History   Administered Date(s) Administered    Influenza High Dose Vaccine PF 10/07/2015, 10/04/2016, 09/13/2017    Influenza Vaccine 10/05/2013    Influenza Vaccine (Whole Virus) 10/30/2012    Influenza Vaccine Split 09/26/2011    Pneumococcal Conjugate (PCV-13) 08/23/2016    Pneumococcal Polysaccharide (PPSV-23) 10/10/2013   Patient confirmed the aforementioned preventative immunization dates are correct. Patient is unable to recall the date of their last tdap & shingles vaccine. NN encouraged patient to check home records & if information obtained, to please notify PCP's office with the details. Patient verbalized agreement. Patient's health maintenance immunization record has been updated & is current. 3. Patient states that he follows his PCP's recommendations for PSA screenings & Colonoscopy screenings (report on file from 10/23/2017 performed by Dr. Collette Zamudio at [place] with recommended follow-up screening in 5 years, 2022). Patient confirmed the aforementioned preventative screening dates. 4. Patient wears corrective lenses. Patient reports having a routine eye exam & glaucoma screening within the last year performed by an ophthalmologist at [facility]. KATEY faxed requesting a copy of patient's last eye exam with glaucoma screening with patient's verbal approval.     Patient verbalized understanding of all information discussed. Patient was given the opportunity to ask questions.   Medication reconciliation completed by MA/ LPN and reviewed by PCP. Patient provided AVS, either a printed version or electronic version in [employer], which includes Medicare Wellness Preventative Screening Table.     Health Maintenance Due   Topic Date Due    DTaP/Tdap/Td series (1 - Tdap) 11/04/1961    EYE EXAM RETINAL OR DILATED Q1  12/20/2015    GLAUCOMA SCREENING Q2Y  11/01/2016    FOOT EXAM Q1  09/20/2017    MICROALBUMIN Q1  07/20/2018

## 2018-07-25 NOTE — PATIENT INSTRUCTIONS
Medicare Part B Preventive Services Guidelines/Limitations Date last completed and Frequency Due Date   Cardiovascular Screening Blood Tests (every 5 years)  Total cholesterol, HDL, Triglycerides Order as a panel if possible Completed 1/2018    As recommended by your PCP As recommended by your PCP or Specialist   Colorectal Cancer Screening  -Fecal occult blood test (annual)  -Flexible sigmoidoscopy (5y)  -Screening colonoscopy (10y)  -Barium Enema Age 49-80; After age [de-identified] if history of abnormal results Completed 10/23/2017     Recommended follow-up in 5 years As recommended by your PCP or Specialist     Counseling to Prevent Tobacco Use (up to 8 sessions per year)  - Counseling greater than 3 and up to 10 minutes  - Counseling greater than 10 minutes Patients must be asymptomatic of tobacco-related conditions to receive as preventive service N/A N/A   Diabetes Screening Tests (at least every 3 years, Medicare covers annually or at 6-month intervals for prediabetic patients)    Fasting blood sugar (FBS) or glucose tolerance test (GTT) Patient must be diagnosed with one of the following:  -Hypertension, Dyslipidemia, obesity, previous impaired FBS or GTT  Or any two of the following: overweight, FH of diabetes, age ? 72, history of gestational diabetes, birth of baby weighing more than 9 pounds Completed 1/2018    Recommended every 3 years for non-diabetics     As recommended by your PCP or Specialist     Glaucoma Screening (no USPSTF recommendation) Diabetes mellitus, family history, , age 48 or over,  American, age 72 or over Completed 3/2018    Recommended annually As recommended by your PCP or Specialist   Prostate Cancer Screening (annually up to age 76)  - Digital rectal exam (LANEY)  - Prostate specific antigen (PSA) Annually (age 48 or over), LANEY not paid separately when covered E/M service is provided on same date As recommended by your PCP or Specialist    Recommended annually to age 76 As recommended by your PCP or Specialist     Seasonal Influenza Vaccination (annually)  Completed 9/2017    Recommended Annually Due Fall 2018   TDAP Vaccination  As recommended by your PCP or Specialist    Recommended every 10 years As recommended by your PCP or Specialist   Zoster (Shingles) Vaccination Covered by Medicare Part D through the pharmacy- PCP provides prescription Completed several years ago    Recommended once over age 48  Complete   Pneumococcal Vaccination (once after 72)  Pneumo 23- 10/2013  Recommended once over the age of 72    Prevnar 15- 8/2016 Recommended once over the age of 72 Complete        Complete   Ultrasound Screening for Abdominal Aortic Aneurysm (AAA) (once) Patient must be referred through IPPE and not have had a screening for abdominal aortic aneurysm before under Medicare. Limited to patients who meet one of the following criteria:  - Men who are 73-68 years old and have smoked more than 100 cigarettes in their lifetime.  -Anyone with a FH of AAA  -Anyone recommended for screening by USPSTF Not indicated unless recommended by PCP   Not indicated unless recommended by PCP     Family Practice Management 2011    Please bring a copy of your completed advance medical directive to the office so it may be added to your medical record. Thank you. If you have any questions or concerns please feel free to contact me at [phone]. It was a pleasure meeting you today and participating in your healthcare. Travis Hadley RN        Medicare Wellness Visit, Male    The best way to live healthy is to have a lifestyle where you eat a well-balanced diet, exercise regularly, limit alcohol use, and quit all forms of tobacco/nicotine, if applicable. Regular preventive services are another way to keep healthy. Preventive services (vaccines, screening tests, monitoring & exams) can help personalize your care plan, which helps you manage your own care.  Screening tests can find health problems at the earliest stages, when they are easiest to treat. [address] [facility] follows the current, evidence-based guidelines published by the [facility] Kevin Mtz ([facility]) when recommending preventive services for our patients. Because we follow these guidelines, sometimes recommendations change over time as research supports it. (For example, a prostate screening blood test is no longer routinely recommended for men with no symptoms.)    Of course, you and your provider may decide to screen more often for some diseases, based on your risk and co-morbidities (chronic disease you are already diagnosed with). Preventive services for you include:    - Medicare offers their members a free annual wellness visit, which is time for you and your primary care provider to discuss and plan for your preventive service needs. Take advantage of this benefit every year!    -All people over age 72 should receive the recommended pneumonia vaccines. Current USPSTF guidelines recommend a series of two vaccines for the best pneumonia protection.     -All adults should have a yearly flu vaccine and a tetanus vaccine every 10 years. All adults age 61 years should receive a shingles vaccine once in their lifetime.      -All adults age 38-68 years who are overweight should have a diabetes screening test once every three years.     -Other screening tests & preventive services for persons with diabetes include: an eye exam to screen for diabetic retinopathy, a kidney function test, a foot exam, and stricter control over your cholesterol.     -Cardiovascular screening for adults with routine risk involves an electrocardiogram (ECG) at intervals determined by the provider.     -Colorectal cancer screenings should be done for adults age 54-65 years with normal risk. There are a number of acceptable methods of screening for this type of cancer. Each test has its own benefits and drawbacks.  Discuss with your provider what is most appropriate for you during your annual wellness visit. The different tests include: colonoscopy (considered the best screening method), a fecal occult blood test, a fecal DNA test, and sigmoidoscopy.    -All adults born between [facility] should be screened once for Hepatitis C.    -An Abdominal Aortic Aneurysm (AAA) Screening is recommended for men age 73-68 who has ever smoked in their lifetime.      Here is a list of your current Health Maintenance items (your personalized list of preventive services) with a due date:  Health Maintenance Due   Topic Date Due    DTaP/Tdap/Td  (1 - Tdap) 11[birth year]04/1961    Eye Exam  12/20/2015    Glaucoma Screening   11/01/2016    Diabetic Foot Care  09/20/2017    Albumin Urine Test  07/20/2018

## 2018-07-29 DIAGNOSIS — I10 ESSENTIAL HYPERTENSION: ICD-10-CM

## 2018-07-29 RX ORDER — VALSARTAN 160 MG/1
TABLET ORAL
Qty: 90 TAB | Refills: 0 | Status: SHIPPED | OUTPATIENT
Start: 2018-07-29 | End: 2018-11-29 | Stop reason: ALTCHOICE

## 2018-07-31 RX ORDER — METFORMIN HYDROCHLORIDE 500 MG/1
TABLET, EXTENDED RELEASE ORAL
Qty: 30 TAB | Refills: 4 | Status: SHIPPED | OUTPATIENT
Start: 2018-07-31 | End: 2018-08-22 | Stop reason: SDUPTHER

## 2018-07-31 RX ORDER — ATORVASTATIN CALCIUM 10 MG/1
TABLET, FILM COATED ORAL
Qty: 30 TAB | Refills: 4 | Status: SHIPPED | OUTPATIENT
Start: 2018-07-31 | End: 2018-08-22 | Stop reason: SDUPTHER

## 2018-08-22 RX ORDER — METFORMIN HYDROCHLORIDE 500 MG/1
500 TABLET, EXTENDED RELEASE ORAL
Qty: 90 TAB | Refills: 1 | Status: SHIPPED | OUTPATIENT
Start: 2018-08-22 | End: 2019-10-16 | Stop reason: SDUPTHER

## 2018-08-22 RX ORDER — ATORVASTATIN CALCIUM 10 MG/1
10 TABLET, FILM COATED ORAL DAILY
Qty: 90 TAB | Refills: 1 | Status: SHIPPED | OUTPATIENT
Start: 2018-08-22 | End: 2019-10-16 | Stop reason: SDUPTHER

## 2018-10-22 RX ORDER — ERGOCALCIFEROL 1.25 MG/1
CAPSULE ORAL
Qty: 12 CAP | Refills: 0 | Status: SHIPPED | OUTPATIENT
Start: 2018-10-22 | End: 2019-01-12 | Stop reason: SDUPTHER

## 2018-11-29 ENCOUNTER — OFFICE VISIT (OUTPATIENT)
Dept: INTERNAL MEDICINE CLINIC | Age: 78
End: 2018-11-29

## 2018-11-29 VITALS
WEIGHT: 182.38 LBS | TEMPERATURE: 98.1 F | BODY MASS INDEX: 27.01 KG/M2 | OXYGEN SATURATION: 97 % | SYSTOLIC BLOOD PRESSURE: 105 MMHG | HEIGHT: 69 IN | DIASTOLIC BLOOD PRESSURE: 65 MMHG | RESPIRATION RATE: 18 BRPM | HEART RATE: 68 BPM

## 2018-11-29 DIAGNOSIS — I10 ESSENTIAL HYPERTENSION: ICD-10-CM

## 2018-11-29 DIAGNOSIS — E11.9 TYPE 2 DIABETES MELLITUS WITH HEMOGLOBIN A1C GOAL OF LESS THAN 7.0% (HCC): Primary | ICD-10-CM

## 2018-11-29 LAB — HBA1C MFR BLD HPLC: NORMAL %

## 2018-11-29 RX ORDER — VALSARTAN 80 MG/1
80 TABLET ORAL DAILY
Qty: 30 TAB | Refills: 1 | Status: SHIPPED | OUTPATIENT
Start: 2018-11-29 | End: 2019-01-27 | Stop reason: SDUPTHER

## 2018-11-29 NOTE — PROGRESS NOTES
HISTORY OF PRESENT ILLNESS Michal Kayser is a 70 y.o. male. HPI Diabetes: He is here for follow up of diabetes. Proteinuria: no 
Neuropathy: no 
Medication change since last visit:  No 
 Diabetic Review of Systems - medication compliance: compliant all of the time, diabetic diet compliance: noncompliant some of the time. Hypoglycemic symptoms: no 
Dilated eye exam in the last year: no 
 
 
Lab Results Component Value Date/Time Hemoglobin A1c 6.6 (H) 01/20/2015 12:02 PM  
 Hemoglobin A1c (POC) 5.5 07/24/2018 03:30 AM  
 
Lab Results Component Value Date/Time Microalb/Creat ratio (ug/mg creat.) 5.1 07/20/2017 12:00 AM  
 
 
 
Last Point of Care HGB A1C Hemoglobin A1c (POC) Date Value Ref Range Status 07/24/2018 5.5 % Final  
  
 
Hypertension: 
Michal Kayser is a 70 y.o. male with hypertension. without Chronic kidney disease stage Medication change since last visit: No 
The patient reports:  taking medications as instructed, no medication side effects noted, patient does not perform home BP monitoring, no chest pain on exertion, no dyspnea on exertion, no swelling of ankles, does note some dizziness when arising for 1 minute 1 week ago. No syncope or vertigo - resolved spontaneously, no palpitations. Lifestyle modification/social history: generally follows a low fat low cholesterol diet, nonsmoker Lab Results Component Value Date/Time Sodium 143 07/24/2018 12:00 AM  
 Potassium 4.0 07/24/2018 12:00 AM  
 Chloride 103 07/24/2018 12:00 AM  
 CO2 24 07/24/2018 12:00 AM  
 Anion gap 5 02/20/2012 11:00 AM  
 Glucose 85 07/24/2018 12:00 AM  
 BUN 14 07/24/2018 12:00 AM  
 Creatinine 1.03 07/24/2018 12:00 AM  
 BUN/Creatinine ratio 14 07/24/2018 12:00 AM  
 GFR est AA 82 07/24/2018 12:00 AM  
 GFR est non-AA 71 07/24/2018 12:00 AM  
 Calcium 9.3 07/24/2018 12:00 AM  
 
 
Patient Active Problem List  
Diagnosis Code  Seizure disorder ([facility] [address] G40.909  ED (erectile dysfunction) N52.9  Bilateral inguinal hernia K40.20  Appendicitis K37  Advanced care planning/counseling discussion Z71.89  Type 2 diabetes mellitus with hemoglobin A1c goal of less than 7.0% (HCC) E11.9  Hyperlipidemia LDL goal <100 E78.5  Essential hypertension I10  
 Adenomatous polyp of colon D12.6  Memory problem R41.3 Past Medical History:  
Diagnosis Date  Appendicitis 9/13/2012  ED (erectile dysfunction) 2/18/2009  
 HTN 2/18/2009  Hypercholesterolemia 2/18/2009  Seizure disorder ([address] 2/18/2009  
 last seizure 4-2011 focal  
 Stroke [facility])   
 hx  old lacunar infarct No Known Allergies Current Outpatient Medications on File Prior to Visit Medication Sig Dispense Refill  VITAMIN D2 50,000 unit capsule TAKE ONE CAPSULE BY MOUTH EVERY 7 DAYS 12 Cap 0  
 metFORMIN ER (GLUCOPHAGE XR) 500 mg tablet Take 1 Tab by mouth daily (with dinner). 90 Tab 1  
 atorvastatin (LIPITOR) 10 mg tablet Take 1 Tab by mouth daily. 90 Tab 1  
 sildenafil citrate (VIAGRA) 100 mg tablet TAKE 1 TABLET BY MOUTH AS NEEDED 6 Tab 5  
 acetaminophen (TYLENOL) 500 mg tablet Take 2 Tabs by mouth three (3) times daily. Indications: Arthritic Pain  lacosamide (VIMPAT) 150 mg tab tablet Take 1 Tab by mouth two (2) times a day. Max Daily Amount: 300 mg. 1 Tab 1  
 aspirin delayed-release (ASPIR-81) 81 mg tablet Take 81 mg by mouth nightly.  KEPPRA 1,000 mg tablet 1 in am 
2 in pm  Indications: TONIC-CLONIC EPILEPSY TREATMENT ADJUNCT No current facility-administered medications on file prior to visit. Social History Tobacco Use  Smoking status: Never Smoker  Smokeless tobacco: Never Used Substance Use Topics  Alcohol use: No  
 Drug use: No  
 
 
 
 
ROS Physical Exam  
Constitutional: He appears well-developed and well-nourished. No distress.   
/65   Pulse 68   Temp 98.1 °F (36.7 °C) (Oral)   Resp 18   Ht 5' 9\" (1.753 m)   Wt 182 lb 6 oz (82.7 kg)   SpO2 97%   BMI 26.93 kg/m² Body mass index is 26.93 kg/m². HENT:  
Mouth/Throat: Oropharynx is clear and moist.  
Eyes: Pupils are equal, round, and reactive to light. Neck: No JVD present. Carotid bruit is not present. Cardiovascular: Normal rate, regular rhythm, normal heart sounds and intact distal pulses. Pulmonary/Chest: Effort normal and breath sounds normal.  
Abdominal: Soft. Bowel sounds are normal. He exhibits no distension. There is no tenderness. Musculoskeletal: He exhibits no edema. Neurological: He is alert. Skin: Skin is warm and dry. He is not diaphoretic. Nursing note and vitals reviewed. ASSESSMENT and PLAN Diagnoses and all orders for this visit: 
 
1. Type 2 diabetes mellitus with hemoglobin A1c goal of less than 7.0% ([facility]) - Well controlled and stable. his medications were reviewed and refilled where necessary as noted below. Labs ordered as noted. -     AMB POC HEMOGLOBIN A1C 
-     MICROALBUMIN, UR, RAND W/ MICROALB/CREAT RATIO 2. Essential hypertension - over controlled with occasional orthostatic symptoms. Encouraged good po hydration. Decrease ARB now to:  
-     valsartan (DIOVAN) 80 mg tablet; Take 1 Tab by mouth daily. Log blood pressures at home while sitting, relaxed 3-5 times weekly and bring to next visit. Pt educated on goal BP of 130/80 on average or lower. Call office as soon as possible if BP's over 140/90 or below 110/50 on multiple occasions and/or with symptoms of dizziness, chest pain, shortness of breath, headache or ankle swelling. Recheck log and bp here in 4 week(s). Labs then as well. Follow-up Disposition: 
Return in about 4 weeks (around 12/27/2018).

## 2018-11-30 LAB
ALBUMIN/CREAT UR: 3.5 MG/G CREAT (ref 0–30)
CREAT UR-MCNC: 271 MG/DL
MICROALBUMIN UR-MCNC: 9.4 UG/ML

## 2019-01-14 ENCOUNTER — OFFICE VISIT (OUTPATIENT)
Dept: NEUROLOGY | Age: 79
End: 2019-01-14

## 2019-01-14 DIAGNOSIS — G40.909 SEIZURE DISORDER (HCC): ICD-10-CM

## 2019-01-14 DIAGNOSIS — F43.21 ADJUSTMENT DISORDER WITH DEPRESSED MOOD: ICD-10-CM

## 2019-01-14 DIAGNOSIS — G31.84 MILD COGNITIVE IMPAIRMENT: Primary | ICD-10-CM

## 2019-01-14 DIAGNOSIS — R41.3 SHORT-TERM MEMORY LOSS: ICD-10-CM

## 2019-01-14 NOTE — PROGRESS NOTES
[address] [facility]  [address]   [place] [address] [place] [place] [address] [address]   [phone] Office   [phone] Fax      Neuropsychology    Initial Diagnostic Interview Note      Referral:  Andressa Francis MD, Dr. Jasso Party is a 70 y.o. right handed   male who was unaccompanied to the initial clinical interview on 1/14/19 . Please refer to his medical records for details pertaining to his history. Briefly, the patient reported that he completed high school without history of previously diagnosed LD and/or receipt of special education services. He is retired from nursing. He has been seen by Dr. Chase Avila on referral from PCP because of concern regarding memory. I reviewed his Neurologic evaluation by Dr. Chase Avila. Of concern is MOCA score which was quite low, surprisingly low, given his generally good clinical presentation. He has a history of seizures and on medication for same. He has a  due to this. Saw Dr. Chase Avila and felt very embarrassed by how poorly he performed on this test.  Her spouse is a physician and is concerned about his memory decline and she is working in geriatrics. He ran into a house during seizure event. There is a report by the spouse that he may have had a seizure one month ago. Mother in law moved in with him and she and his spouse are constantly reminding him to take his medication. He has swelling of the gums due to medication. Spouse assists with finances. He has noticed the memory decline also. Forgets the content of conversations. Misplaces things. Loses words at times. Starts tasks and does not complete. Loses words/train of thought at times. His spouse started to do the family finances after a couple of checks came back improper. This has been going on for about a year and progressive. Mother may have had dementia type concerns.   Balance is pretty good, no falls. No headaches. No acute or focal issues. No other known neurologic concerns. He does have arthritis. No changes in sense of taste or smell. He gets depressed and down and one of the reasons he is here is to figure out if he has dementia, depression, or medication issues. Family notes mood changes - moreso than he does. No appetite or sleep changes. He started having seizures about the age 47-56 years old. On Keppra and see med list below. DM well controlled. He states he is having a hard time. His daughter is struggling also. She is adopted, and may be struggling some. No counseling or psychiatrist.      No previous neuropsych testing, save for neuro screen done by Dr. Blair Booth (see records in EMR).          Neuropsychological Mental Status Exam (NMSE):  Historian: Good  Praxis: No UE apraxia  R/L Orientation: Intact to self and to other  Dress: within normal limits   Weight: within normal limits   Appearance/Hygiene: within normal limits   Gait: Slow, short, shuffling steps  Assistive Devices: Glasses  Mood: within normal limits   Affect: within normal limits   Comprehension Mildly impaired  Thought Process: within normal limits   Expressive Language: within normal limits   Receptive Language: within normal limits   Motor:  No cognitive or motor perseveration  ETOH: Denied  Tobacco: Denied  Illicit: Denied  SI/HI: Denied  Psychosis: Denied  Insight: Within normal limits  Judgment: Within normal limits  Other Psych:      Past Medical History:   Diagnosis Date    Appendicitis 9/13/2012    ED (erectile dysfunction) 2/18/2009    HTN 2/18/2009    Hypercholesterolemia 2/18/2009    Seizure disorder ([facility] [address] 2/18/2009    last seizure 4-2011 focal    Stroke [facility])     hx  old lacunar infarct       Past Surgical History:   Procedure Laterality Date    COLONOSCOPY  2002    COLONOSCOPY N/A 10/23/2017    COLONOSCOPY performed by Cisco Peraza MD [facility] HX APPENDECTOMY  2012    HX HERNIA REPAIR  2011       No Known Allergies    Family History   Problem Relation Age of Onset    Hypertension Mother     Stroke Mother    [de-identified] Dementia Father     Hypertension Sister     Hypertension Brother     Diabetes Other     Cancer Neg Hx        Social History     Tobacco Use    Smoking status: Never Smoker    Smokeless tobacco: Never Used   Substance Use Topics    Alcohol use: No    Drug use: No       Current Outpatient Medications   Medication Sig Dispense Refill    VITAMIN D2 50,000 unit capsule TAKE ONE CAPSULE BY MOUTH EVERY 7 DAYS 12 Cap 0    valsartan (DIOVAN) 80 mg tablet Take 1 Tab by mouth daily. 30 Tab 1    metFORMIN ER (GLUCOPHAGE XR) 500 mg tablet Take 1 Tab by mouth daily (with dinner). 90 Tab 1    atorvastatin (LIPITOR) 10 mg tablet Take 1 Tab by mouth daily. 90 Tab 1    sildenafil citrate (VIAGRA) 100 mg tablet TAKE 1 TABLET BY MOUTH AS NEEDED 6 Tab 5    acetaminophen (TYLENOL) 500 mg tablet Take 2 Tabs by mouth three (3) times daily. Indications: Arthritic Pain      lacosamide (VIMPAT) 150 mg tab tablet Take 1 Tab by mouth two (2) times a day. Max Daily Amount: 300 mg. 1 Tab 1    aspirin delayed-release (ASPIR-81) 81 mg tablet Take 81 mg by mouth nightly.  KEPPRA 1,000 mg tablet 1 in am  2 in pm  Indications: TONIC-CLONIC EPILEPSY TREATMENT ADJUNCT           Plan:  Obtain authorization for testing from insurance company. Report to follow once testing, scoring, and interpretation completed. ? Organic based neurocognitive issues versus mood disorder or combination of same. ? Problems organic, functional, or both? This note will not be viewable in [address] 70year old male referred by PCP with cognitive changes as noted above.  ? Organic based cognitive decline versus mood versus age or combination of same. Time: [id number] x 1 NSE 45 minutes spent with patient and review of records as noted above.

## 2019-01-27 DIAGNOSIS — I10 ESSENTIAL HYPERTENSION: ICD-10-CM

## 2019-01-27 RX ORDER — VALSARTAN 80 MG/1
TABLET ORAL
Qty: 90 TAB | Refills: 0 | Status: SHIPPED | OUTPATIENT
Start: 2019-01-27 | End: 2019-04-28 | Stop reason: SDUPTHER

## 2019-01-27 NOTE — TELEPHONE ENCOUNTER
Last visit noted, labs checked and refill deemed appropriate at this time. Verbal refill order authorized by covering physicians at [address] [facility] for Dr. Fanny Alvarado during his absence.     Beulah Barragan, AnaD, BCPS, CDE

## 2019-01-29 ENCOUNTER — OFFICE VISIT (OUTPATIENT)
Dept: NEUROLOGY | Age: 79
End: 2019-01-29

## 2019-01-29 DIAGNOSIS — F32.1 MODERATE MAJOR DEPRESSION (HCC): ICD-10-CM

## 2019-01-29 DIAGNOSIS — G30.1 LATE ONSET ALZHEIMER'S DISEASE WITHOUT BEHAVIORAL DISTURBANCE (HCC): Primary | ICD-10-CM

## 2019-01-29 DIAGNOSIS — F02.80 LATE ONSET ALZHEIMER'S DISEASE WITHOUT BEHAVIORAL DISTURBANCE (HCC): Primary | ICD-10-CM

## 2019-01-29 DIAGNOSIS — F41.9 SEVERE ANXIETY: ICD-10-CM

## 2019-01-30 NOTE — PROGRESS NOTES
[address] [facility],[address]  [address]   [place] [address] [facility] [address] [facility]    [phone] Office   [phone] Fax      Neuropsychological Evaluation Report    Referral:  Chad Lemus MD, Serg Godfrey is a 70 y.o. right handed   male who was unaccompanied to the initial clinical interview on 1/14/19 . Please refer to his medical records for details pertaining to his history. Briefly, the patient reported that he completed high school without history of previously diagnosed LD and/or receipt of special education services. He is retired from nursing. He has been seen by Dr. Jeevan Ibarra on referral from PCP because of concern regarding memory. I reviewed his Neurologic evaluation by Dr. Jeevan Ibarra. Of concern is MOCA score which was quite low, surprisingly low, given his generally good clinical presentation. He has a history of seizures and on medication for same. He has a  due to this. Saw Dr. Jeevan Ibarra and felt very embarrassed by how poorly he performed on this test.  Her spouse is a physician and is concerned about his memory decline and she is working in geriatrics. He ran into a house during seizure event. There is a report by the spouse that he may have had a seizure one month ago. Mother in law moved in with him and she and his spouse are constantly reminding him to take his medication. He has swelling of the gums due to medication. Spouse assists with finances. He has noticed the memory decline also. Forgets the content of conversations. Misplaces things. Loses words at times. Starts tasks and does not complete. Loses words/train of thought at times. His spouse started to do the family finances after a couple of checks came back improper. This has been going on for about a year and progressive. Mother may have had dementia type concerns. Balance is pretty good, no falls. No headaches. No acute or focal issues. No other known neurologic concerns. He does have arthritis. No changes in sense of taste or smell. He gets depressed and down and one of the reasons he is here is to figure out if he has dementia, depression, or medication issues. Family notes mood changes - moreso than he does. No appetite or sleep changes. He started having seizures about the age 47-56 years old. On Keppra and see med list below. DM well controlled. He states he is having a hard time. His daughter is struggling also. She is adopted, and may be struggling some. No counseling or psychiatrist.      No previous neuropsych testing, save for neuro screen done by Dr. Angelica Mcfarland (see records in EMR).          Neuropsychological Mental Status Exam (NMSE):  Historian: Good  Praxis: No UE apraxia  R/L Orientation: Intact to self and to other  Dress: within normal limits   Weight: within normal limits   Appearance/Hygiene: within normal limits   Gait: Slow, short, shuffling steps  Assistive Devices: Glasses  Mood: within normal limits   Affect: within normal limits   Comprehension Mildly impaired  Thought Process: within normal limits   Expressive Language: within normal limits   Receptive Language: within normal limits   Motor:  No cognitive or motor perseveration  ETOH: Denied  Tobacco: Denied  Illicit: Denied  SI/HI: Denied  Psychosis: Denied  Insight: Within normal limits  Judgment: Within normal limits  Other Psych:      Past Medical History:   Diagnosis Date    Appendicitis 9/13/2012    ED (erectile dysfunction) 2/18/2009    HTN 2/18/2009    Hypercholesterolemia 2/18/2009    Seizure disorder ([facility] [address] 2/18/2009    last seizure 4-2011 focal    Stroke [facility])     hx  old lacunar infarct       Past Surgical History:   Procedure Laterality Date    COLONOSCOPY  2002    COLONOSCOPY N/A 10/23/2017    COLONOSCOPY performed by Eloise Zazueta MD at [facility] HX APPENDECTOMY  2012 [facility]  2011 No Known Allergies    Family History   Problem Relation Age of Onset    Hypertension Mother     Stroke Mother     Dementia Father     Hypertension Sister     Hypertension Brother     Diabetes Other     Cancer Neg Hx        Social History     Tobacco Use    Smoking status: Never Smoker    Smokeless tobacco: Never Used   Substance Use Topics    Alcohol use: No    Drug use: No       Current Outpatient Medications   Medication Sig Dispense Refill    valsartan (DIOVAN) 80 mg tablet TAKE ONE TABLET BY MOUTH DAILY 90 Tab 0    VITAMIN D2 50,000 unit capsule TAKE ONE CAPSULE BY MOUTH EVERY 7 DAYS 12 Cap 0    metFORMIN ER (GLUCOPHAGE XR) 500 mg tablet Take 1 Tab by mouth daily (with dinner). 90 Tab 1    atorvastatin (LIPITOR) 10 mg tablet Take 1 Tab by mouth daily. 90 Tab 1    sildenafil citrate (VIAGRA) 100 mg tablet TAKE 1 TABLET BY MOUTH AS NEEDED 6 Tab 5    acetaminophen (TYLENOL) 500 mg tablet Take 2 Tabs by mouth three (3) times daily. Indications: Arthritic Pain      lacosamide (VIMPAT) 150 mg tab tablet Take 1 Tab by mouth two (2) times a day. Max Daily Amount: 300 mg. 1 Tab 1    aspirin delayed-release (ASPIR-81) 81 mg tablet Take 81 mg by mouth nightly.  KEPPRA 1,000 mg tablet 1 in am  2 in pm  Indications: TONIC-CLONIC EPILEPSY TREATMENT ADJUNCT           Plan:  Obtain authorization for testing from insurance company. Report to follow once testing, scoring, and interpretation completed. ? Organic based neurocognitive issues versus mood disorder or combination of same. ? Problems organic, functional, or both? This note will not be viewable in [address] 70year old male referred by PCP with cognitive changes as noted above.  ? Organic based cognitive decline versus mood versus age or combination of same. Time: 96116 x 1 NSE 45 minutes spent with patient and review of records as noted above.      Neuropsychological Test Results  Patient Testing 1/29/19 Report Completed 1/30/19  A Psychometrist Assisted w/ portions of this evaluation while under my direct  supervision    The following evaluation procedures/tests were administered:      Neuropsychologist Administered/Interpreted:  Neuropsychological Mental Status Exam, Revised Memory & Behavior Checklist,  Mini Mental Status Exam, Clock Drawing Test, Test Of Premorbid Functioning, Daniel-Melzack Pain Questionnaire, History Taking  & Clinical Interview With The Patient, CPT-II, Review Of Available Records. Psychometrist Administered under Neuropsychologist Supervision & Neuropsychologist Interpreted:  Verbal Fluency Tests, Francesco & Francesco - Revised, Trailmaking Test Parts A & B, Wechsler Adult Intelligence Scale - IV,  [place] Verbal Learning Test - 3, Grooved Pegboard, Cordova Depression Inventory - II, Cordova Anxiety Inventory, Personality Assessment Inventory. Test Findings:  Test Findings:  Note:  The patients raw data have been compared with currently available norms which include demographic corrections for age, gender, and/or education. Sometimes, the patients scores are compared to demographically similar individuals as close to the patients age, education level, etc., as possible. \"Average\" is viewed as being +/- 1 standard deviation (SD) from the stated mean for a particular test score. \"Low average\" is viewed as being between 1 and 2 SD below the mean, and above average is viewed as being 1 and 2 SD above the mean. Scores falling in the borderline range (between 1-1/2 and 2 SD below the mean) are viewed with particular attention as to whether they are normal or abnormal neurocognitive test scores. Other methods of inference in analyzing the test data are also utilized, including the pattern and range of scores in the profile, bilateral motor functions, and the presence, if any, of pathognomonic signs.         Behaviorally, the patient was friendly and cooperative and appeared motivated to perform well during this examination. Within this context, the results of this evaluation are viewed as a valid reflection of the patients actual neurocognitive and emotional status. His MMSE score of 23/30 correct was impaired. In this regard, he was not oriented to county. Serial 7s were 2/5 correct. Recall for three words after a brief delay was 1/3 correct. Visual construction was impaired. Clock drawing was impaired. His structured word list fluency, as assessed by the FAS Test, was within the below average range with a T score of 41. Category fluency was within the moderately impaired range with a T score of 27. Confrontation naming ability, as assessed by the [facility] - Revised, was within the mildly impaired range at 28/60 correct (T = 37). This pattern of performance is indicative of a patient who is at increased risk for day-to-day problems with verbal fluency and confrontation naming. The patient was administered the [facility] Continuous Performance Test - III,a computer administered test of sustained attention, and review of the subscales within this instrument revealed severe concerns for inattentiveness without impulsivity. This pattern of performance is indicative of a patient who is at increased risk for day-to-day problems with sustained visual attention/concentration. The patient is showing problems with working memory capacity (4th %ile) and processing speed (3rd %ile) on the WAIS-IV. His Verbal Comprehension Index score of 78 was within the borderline range. His Perceptual Reasoning Index score of 65 was extremely low. These scores reflect a decline in functioning based on an assessment of premorbid functioning. The patient was administered the [place] Verbal Learning Test  - 3 and generated an impaired range (and flat) learning curve over five repeated auditory word list learning trials. An interference trial was within the normal range.   Free and cued, short and long delayed recall were all impaired. Recognition recall was impaired. Forced choice recall was low normal (14/16), suggesting that he put forth good effort on this test.  .  This pattern of performance is indicative of a patient who is at increased risk for day-to-day problems with auditory learning and memory. Simple timed visual motor sequencing (Trailmaking Test Part A) was within the mildly to moderately impaired range with a T score of 30. His performance on a similar, but more complex task of timed visual motor sequencing (Trailmaking Test Part B) was within the mildly to moderately impaired range with a T score of 32. This latter test was discontinued, and numerous sequencing errors are noted. Taken together, this pattern of performance is indicative of a patient who is at increased risk for day-to-day problems with executive functioning. Fine motor dexterity was within the moderately to severely impaired range bilaterally. This pattern of performance is  indicative of a patient who is at increased risk for day-to-day problems with bilateral motor dexterity. The patient rated his current level of pain as \"0/5- No Pain\" on the Daniel-Melzack Pain Questionnaire. His Cordova Depression Inventory- II score of 22 was within the moderately depressed range. His Cordova Anxiety Inventory score of 26 reflected severe anxiety. The patient was administered the Personality Assessment Inventory and generated an invalid id profile for further interpretation due to a high level of patient response inconsistencies. Impressions & Recommendations: This patient generated an abnormal range Neuropsychological Evaluation with respect to neurocognitive functioning.   In this regard, he is showing problems with mental status, verbal fluency, confrontation naming, sustained attention, processing speed, working memory, perceptual reasoning, verbal comprehension, bilateral motor skills, auditory learning, auditory memory, and executive functioning. Casual language skills will serve to mask underlying cognitive deficits at times. Emotionally, the patient reported severe anxiety and moderate depression on two brief self-report questionnaires. Lengthier assessment of personality functioning could not be further interpreted due to a high level of patient response inconsistencies. While a high level of emotional distress is reported, the generated neurocognitive profile is not consistent with pseudodementia. This is at-least mild dementia (likely AD) compounded by/exacerbated by marked emotional distress. In addition to continued medical care, my recommendations include consideration for memory management medication, treatment for marked attention problems, and psychiatric interventions for his marked anxiety and depression. The patient should be encouraged to remain as mentally, socially, and physically active as possible. The actual severity of his dementia is unclear, given the marked functional overlay. The patient's generated cognitive difficulties are significant to the degree whereby it is my opinion that he should not live independently without appropriate supervision for those domains with a heavy memory emphasis. This includes medication management supervision and supervision of financial dealings. Marked problems with attention and reaction time raise concern regarding driving safety, and I suggest consideration for a formal evaluation of same. I am concerned about his marked problems with executive functioning and the potential impact these issues have upon his medical and financial decision making. A POA should be put into place if this has not been done so already. I would like to see him again as soon as mood improves to better clarify nature, extent, and degree of the underlying organic based cognitive deficits. Baseline now established.   Clinical correlation is, of course, indicated. I will discuss these findings with the patient and family when they follow up with me in the near future. A follow up Neuropsychological Evaluation is indicated on a prn basis. DIAGNOSES: Alzheimer's Dementia without Behavioral Disturbance, severity unclear    Anxiety - Severe    Depression - Moderate     The above information is based upon information currently available to me. If there is any additional information of which I am currently unaware, I would be more than happy to review it upon having it made available to me. Thank you for the opportunity to see this interesting individual.     Sincerely,       Brielle Fernandez. Melchor Rodriges, EdS      Attachments:  IQ Test Results (In Media Section Of This EMR)    Cc: Cindy Finch MD    Time Documentation:    60950 x 1: Neurobehavioral Status Exam/Clinical Interview: (1 hour, (already billed on first date of service)    35098*3 Neuropsych testing/data gathering by Neuropsychologist  (35 additional minutes, see above)     96138 x 1  96139 x 6 Test Administration/Data Gathering By Technician: (3.5 hours). 96578 x 1 (first 30 minutes), 15986 x 6 (each additional 30 minutes)    96132 x 1  96133 x 1 Testing Evaluation Services by Neuropsychologist (1 hour, 50 minutes) 96132 x 1 (first hour), 96133 x 1 (50 minutes)    Definitions:      12081/16240:  Neurobehavioral Status Exam, Clinical interview. Clinical assessment of thinking, reasoning and judgment, by neuropsychologist, both face to face time with patient and time interpreting those test results and reporting, first and subsequent hours)    68186/01158: Neuropsychological Test Administration by Technician/Psychometrist, first 30 minutes and each additional 30 minutes. The above includes: Record review. Review of history provided by patient. Review of collaborative information. Testing by Clinician. Review of raw data. Scoring.  Report writing of individual tests administered by Clinician. Integration of individual tests administered by psychometrist with NSE/testing by clinician, review of records/history/collaborative information, case Conceptualization, treatment planning, clinical decision making, report writing, coordination Of Care. Psychometry test codes as time spent by psychometrist administering and scoring neurocognitive/psychological tests under supervision of neuropsychologist.  Integral services including scoring of raw data, data interpretation, case conceptualization, report writing etcetera were initiated after the patient finished testing/raw data collected and was completed on the date the report was signed.

## 2019-02-08 RX ORDER — VALSARTAN 160 MG/1
TABLET ORAL
Qty: 90 TAB | Refills: 0 | OUTPATIENT
Start: 2019-02-08

## 2019-02-08 NOTE — TELEPHONE ENCOUNTER
Confirmed with pharmacy patient on 80mg. This was sent automatically. Pharmacist to update record.     Mandi Reid, AnaD, BCPS, CDE

## 2019-03-05 ENCOUNTER — TELEPHONE (OUTPATIENT)
Dept: NEUROLOGY | Age: 79
End: 2019-03-05

## 2019-03-05 NOTE — TELEPHONE ENCOUNTER
----- Message from [place] sent at 3/5/2019 11:43 AM EST -----  Regarding: Dr. Sera Smith  Pt's wife canceled pt's appt for 03/12/19 for a f/up, and would like a call to r/s . Best contact number [phone].

## 2019-03-28 ENCOUNTER — OFFICE VISIT (OUTPATIENT)
Dept: INTERNAL MEDICINE CLINIC | Age: 79
End: 2019-03-28

## 2019-03-28 ENCOUNTER — TELEPHONE (OUTPATIENT)
Dept: INTERNAL MEDICINE CLINIC | Age: 79
End: 2019-03-28

## 2019-03-28 ENCOUNTER — HOSPITAL ENCOUNTER (OUTPATIENT)
Dept: LAB | Age: 79
Discharge: HOME | End: 2019-03-28
Payer: MEDICARE

## 2019-03-28 VITALS
HEIGHT: 69 IN | DIASTOLIC BLOOD PRESSURE: 86 MMHG | SYSTOLIC BLOOD PRESSURE: 130 MMHG | TEMPERATURE: 97.9 F | RESPIRATION RATE: 16 BRPM | OXYGEN SATURATION: 96 % | BODY MASS INDEX: 27.55 KG/M2 | WEIGHT: 186 LBS | HEART RATE: 66 BPM

## 2019-03-28 DIAGNOSIS — E78.5 HYPERLIPIDEMIA LDL GOAL <100: ICD-10-CM

## 2019-03-28 DIAGNOSIS — F41.9 ANXIETY: ICD-10-CM

## 2019-03-28 DIAGNOSIS — E11.9 TYPE 2 DIABETES MELLITUS WITH HEMOGLOBIN A1C GOAL OF LESS THAN 7.0% (HCC): ICD-10-CM

## 2019-03-28 DIAGNOSIS — I10 ESSENTIAL HYPERTENSION: Primary | ICD-10-CM

## 2019-03-28 DIAGNOSIS — G31.84 MCI (MILD COGNITIVE IMPAIRMENT): ICD-10-CM

## 2019-03-28 PROCEDURE — 83036 HEMOGLOBIN GLYCOSYLATED A1C: CPT

## 2019-03-28 PROCEDURE — 84443 ASSAY THYROID STIM HORMONE: CPT

## 2019-03-28 PROCEDURE — 36415 COLL VENOUS BLD VENIPUNCTURE: CPT

## 2019-03-28 PROCEDURE — 82607 VITAMIN B-12: CPT

## 2019-03-28 PROCEDURE — 82043 UR ALBUMIN QUANTITATIVE: CPT

## 2019-03-28 PROCEDURE — 80061 LIPID PANEL: CPT

## 2019-03-28 PROCEDURE — 80053 COMPREHEN METABOLIC PANEL: CPT

## 2019-03-28 RX ORDER — DONEPEZIL HYDROCHLORIDE 5 MG/1
5 TABLET, FILM COATED ORAL
Qty: 30 TAB | Refills: 0 | Status: CANCELLED | OUTPATIENT
Start: 2019-03-28

## 2019-03-28 NOTE — TELEPHONE ENCOUNTER
Spoke with patient's wife Felisa Dempsey regarding her 's neuropsychiatric evaluation. Discussed that he scored very high on the anxiety and depression marking and less so on dementia. I discussed that at this point we will not start any medications. But rather will get him in with a therapist and start working with him. No other changes in medications. Wife understood and agreed with plan.

## 2019-03-28 NOTE — PROGRESS NOTES
Shelby Wilson is a 70 y.o. male who presents today for Diabetes; Hypertension; and Cholesterol Problem Reyna Posada He has a history of  
Patient Active Problem List  
Diagnosis Code  Seizure disorder ([facility] [address] G40.909  ED (erectile dysfunction) N52.9  Bilateral inguinal hernia K40.20  Appendicitis K37  Advanced care planning/counseling discussion Z71.89  Type 2 diabetes mellitus with hemoglobin A1c goal of less than 7.0% (HCC) E11.9  Hyperlipidemia LDL goal <100 E78.5  Essential hypertension I10  
 Adenomatous polyp of colon D12.6  Memory problem R41.3 Reynabonilla Posada Today patient is here for f/u. Worsening memory issues. Seen by NS who noted ? Worsening memory but severe anxiety. I discussed these findings with patient. He notes that he does have a good bit of stress with his daughter but does not consider himself a very anxious person. We discussed options including starting Aricept. Patient is reluctant to start any medications I agree with this. I provided him with a list of therapist.  I will also be calling his wife to discuss these findings. Diabetes Mellitus follow-up - on 500mg metformin. No side effects from medication. Given memory loss we will check a B12 level as he has been on metformin for some time Last hemoglobin a1c Lab Results Component Value Date/Time Hemoglobin A1c 6.6 (H) 01/20/2015 12:02 PM  
 Hemoglobin A1c (POC) 5.5 07/24/2018 03:30 AM  
 
No components found for: POCA1C, HBA1C POC 
medication compliance: compliant all of the time. Diabetic diet compliance: compliant most of the time. Further diabetic ROS: no polyuria or polydipsia, no chest pain, dyspnea or TIA's, no numbness, tingling or pain in extremities. Microalbuminuria:  
Lab Results Component Value Date/Time Microalb/Creat ratio (ug/mg creat.) 3.5 11/29/2018 02:26 PM  
 
Hypertension - On lower dose of ARB. Hypertension ROS: taking medications as instructed, no medication side effects noted, no TIA's, no chest pain on exertion, no dyspnea on exertion, no swelling of ankles     reports that he has never smoked. He has never used smokeless tobacco.    reports that he does not drink alcohol. BP Readings from Last 2 Encounters:  
03/28/19 130/86  
11/29/18 105/65 Hyperlipidemia On Statin. ROS: taking medications as instructed, no medication side effects noted No new myalgias, no joint pains, no weakness No TIA's, no chest pain on exertion, no dyspnea on exertion, no swelling of ankles. Lab Results Component Value Date/Time Cholesterol, total 140 01/30/2018 10:29 AM  
 HDL Cholesterol 51 01/30/2018 10:29 AM  
 LDL, calculated 70 01/30/2018 10:29 AM  
 VLDL, calculated 19 01/30/2018 10:29 AM  
 Triglyceride 96 01/30/2018 10:29 AM  
 CHOL/HDL Ratio 3.2 04/01/2010 09:09 AM  
 
 
ROS Review of Systems Constitutional: Negative for chills, fever and weight loss. Respiratory: Negative for cough and shortness of breath. Cardiovascular: Negative for chest pain, palpitations and leg swelling. Gastrointestinal: Negative for abdominal pain, diarrhea, heartburn, nausea and vomiting. Genitourinary: Negative for dysuria, frequency, hematuria and urgency. Musculoskeletal: Negative for back pain, joint pain, myalgias and neck pain. Neurological: Negative. Endo/Heme/Allergies: Does not bruise/bleed easily. Psychiatric/Behavioral: Positive for memory loss. Negative for depression, hallucinations, substance abuse and suicidal ideas. The patient is not nervous/anxious. Visit Vitals /86 (BP 1 Location: Left arm, BP Patient Position: Sitting) Pulse 66 Temp 97.9 °F (36.6 °C) (Oral) Resp 16 Ht 5' 9\" (1.753 m) Wt 186 lb (84.4 kg) SpO2 96% BMI 27.47 kg/m² Physical Exam  
Constitutional: He is oriented to person, place, and time. He appears well-developed and well-nourished. HENT:  
Head: Normocephalic and atraumatic. Eyes: Pupils are equal, round, and reactive to light. EOM are normal.  
Cardiovascular: Normal rate and regular rhythm. No murmur heard. Pulmonary/Chest: Effort normal and breath sounds normal. No respiratory distress. Musculoskeletal: Normal range of motion. He exhibits no edema. Neurological: He is alert and oriented to person, place, and time. Skin: Skin is warm and dry. He is not diaphoretic. Psychiatric: He has a normal mood and affect. His behavior is normal.  
 
 
 
Current Outpatient Medications Medication Sig  
 valsartan (DIOVAN) 80 mg tablet TAKE ONE TABLET BY MOUTH DAILY  VITAMIN D2 50,000 unit capsule TAKE ONE CAPSULE BY MOUTH EVERY 7 DAYS  metFORMIN ER (GLUCOPHAGE XR) 500 mg tablet Take 1 Tab by mouth daily (with dinner).  atorvastatin (LIPITOR) 10 mg tablet Take 1 Tab by mouth daily.  sildenafil citrate (VIAGRA) 100 mg tablet TAKE 1 TABLET BY MOUTH AS NEEDED  
 acetaminophen (TYLENOL) 500 mg tablet Take 2 Tabs by mouth three (3) times daily. Indications: Arthritic Pain  lacosamide (VIMPAT) 150 mg tab tablet Take 1 Tab by mouth two (2) times a day. Max Daily Amount: 300 mg.  aspirin delayed-release (ASPIR-81) 81 mg tablet Take 81 mg by mouth nightly.  KEPPRA 1,000 mg tablet 1 in am 
2 in pm  Indications: TONIC-CLONIC EPILEPSY TREATMENT ADJUNCT No current facility-administered medications for this visit. Past Medical History:  
Diagnosis Date  Appendicitis 9/13/2012  ED (erectile dysfunction) 2/18/2009  
 HTN 2/18/2009  Hypercholesterolemia 2/18/2009  Seizure disorder ([facility] [address] 2/18/2009  
 last seizure 4-2011 focal  
 Stroke [facility])   
 hx  old lacunar infarct Past Surgical History:  
Procedure Laterality Date  COLONOSCOPY  2002  COLONOSCOPY N/A 10/23/2017 COLONOSCOPY performed by Barbara Krishnan MD at [address]  2012  HX HERNIA REPAIR  2011 Social History Tobacco Use  Smoking status: Never Smoker  Smokeless tobacco: Never Used Substance Use Topics  Alcohol use: No  
  
Family History Problem Relation Age of Onset  Hypertension Mother  Stroke Mother  Dementia Father  Hypertension Sister  Hypertension Brother  Diabetes Other  Cancer Neg Hx No Known Allergies Assessment/Plan Diagnoses and all orders for this visit: 1. Essential hypertension -blood pressure stable on lower dose ARB 2. Type 2 diabetes mellitus with hemoglobin A1c goal of less than 7.0% (HCC) -on low-dose metformin. We will repeat his A1c today 
-     HEMOGLOBIN A1C WITH EAG 
-     METABOLIC PANEL, COMPREHENSIVE 
-     MICROALBUMIN, UR, RAND W/ MICROALB/CREAT RATIO 3. Hyperlipidemia LDL goal <100 -repeat cholesterol -     LIPID PANEL 4. MCI (mild cognitive impairment) -patient was more severe in the anxiety range. I discussed these findings with him. He would like me to call his wife and discussed this with her as well. I agree with not starting any medications at this time but he can discuss these findings also with his neurologist.  I suggest seeing a therapist to see if we can uncover with this anxiety is regarding. 
-     TSH 3RD GENERATION 
-     VITAMIN B12 
 
5. Anxiety 
-     TSH 3RD GENERATION 
-     VITAMIN B12 
 
 
 
 
 
Misha Garcia MD 
3/28/2019 This note was created with the help of speech recognition software (Dragon) and may contain some 'sound alike' errors.

## 2019-03-29 LAB
ALBUMIN SERPL-MCNC: 4.7 G/DL (ref 3.5–4.8)
ALBUMIN/CREAT UR: 6.9 MG/G CREAT (ref 0–30)
ALBUMIN/GLOB SERPL: 1.5 {RATIO} (ref 1.2–2.2)
ALP SERPL-CCNC: 76 IU/L (ref 39–117)
ALT SERPL-CCNC: 30 IU/L (ref 0–44)
AST SERPL-CCNC: 29 IU/L (ref 0–40)
BILIRUB SERPL-MCNC: 0.6 MG/DL (ref 0–1.2)
BUN SERPL-MCNC: 14 MG/DL (ref 8–27)
BUN/CREAT SERPL: 12 (ref 10–24)
CALCIUM SERPL-MCNC: 9.9 MG/DL (ref 8.6–10.2)
CHLORIDE SERPL-SCNC: 103 MMOL/L (ref 96–106)
CHOLEST SERPL-MCNC: 144 MG/DL (ref 100–199)
CO2 SERPL-SCNC: 23 MMOL/L (ref 20–29)
CREAT SERPL-MCNC: 1.17 MG/DL (ref 0.76–1.27)
CREAT UR-MCNC: 329.2 MG/DL
EST. AVERAGE GLUCOSE BLD GHB EST-MCNC: 117 MG/DL
GLOBULIN SER CALC-MCNC: 3.2 G/DL (ref 1.5–4.5)
GLUCOSE SERPL-MCNC: 90 MG/DL (ref 65–99)
HBA1C MFR BLD: 5.7 % (ref 4.8–5.6)
HDLC SERPL-MCNC: 52 MG/DL
INTERPRETATION, 910389: NORMAL
LDLC SERPL CALC-MCNC: 73 MG/DL (ref 0–99)
Lab: NORMAL
MICROALBUMIN UR-MCNC: 22.7 UG/ML
POTASSIUM SERPL-SCNC: 4.3 MMOL/L (ref 3.5–5.2)
PROT SERPL-MCNC: 7.9 G/DL (ref 6–8.5)
SODIUM SERPL-SCNC: 143 MMOL/L (ref 134–144)
TRIGL SERPL-MCNC: 96 MG/DL (ref 0–149)
TSH SERPL DL<=0.005 MIU/L-ACNC: 1.36 UIU/ML (ref 0.45–4.5)
VIT B12 SERPL-MCNC: 492 PG/ML (ref 232–1245)
VLDLC SERPL CALC-MCNC: 19 MG/DL (ref 5–40)

## 2019-05-30 ENCOUNTER — TELEPHONE (OUTPATIENT)
Dept: INTERNAL MEDICINE CLINIC | Age: 79
End: 2019-05-30

## 2019-05-30 NOTE — TELEPHONE ENCOUNTER
Patient's lab results letter dated for 03/30/2019 we mailed to him per his request, states he lost his original copy . Address was verified.

## 2019-10-16 ENCOUNTER — OFFICE VISIT (OUTPATIENT)
Dept: INTERNAL MEDICINE CLINIC | Age: 79
End: 2019-10-16

## 2019-10-16 VITALS
BODY MASS INDEX: 26.22 KG/M2 | SYSTOLIC BLOOD PRESSURE: 100 MMHG | DIASTOLIC BLOOD PRESSURE: 70 MMHG | HEIGHT: 69 IN | RESPIRATION RATE: 16 BRPM | WEIGHT: 177 LBS | HEART RATE: 68 BPM | OXYGEN SATURATION: 98 % | TEMPERATURE: 97.7 F

## 2019-10-16 DIAGNOSIS — Z00.00 MEDICARE ANNUAL WELLNESS VISIT, SUBSEQUENT: Primary | ICD-10-CM

## 2019-10-16 DIAGNOSIS — I10 ESSENTIAL HYPERTENSION: ICD-10-CM

## 2019-10-16 DIAGNOSIS — Z71.89 ADVANCED DIRECTIVES, COUNSELING/DISCUSSION: ICD-10-CM

## 2019-10-16 DIAGNOSIS — G40.909 SEIZURE DISORDER (HCC): ICD-10-CM

## 2019-10-16 DIAGNOSIS — F41.9 ANXIETY: ICD-10-CM

## 2019-10-16 DIAGNOSIS — G31.84 MCI (MILD COGNITIVE IMPAIRMENT): ICD-10-CM

## 2019-10-16 DIAGNOSIS — E11.9 TYPE 2 DIABETES MELLITUS WITH HEMOGLOBIN A1C GOAL OF LESS THAN 7.0% (HCC): ICD-10-CM

## 2019-10-16 DIAGNOSIS — Z23 ENCOUNTER FOR IMMUNIZATION: ICD-10-CM

## 2019-10-16 RX ORDER — MEMANTINE HYDROCHLORIDE 10 MG/1
TABLET ORAL 2 TIMES DAILY
COMMUNITY

## 2019-10-16 RX ORDER — BUSPIRONE HYDROCHLORIDE 10 MG/1
10 TABLET ORAL 2 TIMES DAILY
Qty: 60 TAB | Refills: 1 | Status: SHIPPED | OUTPATIENT
Start: 2019-10-16 | End: 2019-10-16

## 2019-10-16 RX ORDER — CITALOPRAM 20 MG/1
20 TABLET, FILM COATED ORAL DAILY
COMMUNITY

## 2019-10-16 NOTE — PROGRESS NOTES
Shingles vaccine: Pt has been educated on new Shingrix and where to obtain inj. Flu vaccine: Pt would like the flu vaccine today.

## 2019-10-16 NOTE — PATIENT INSTRUCTIONS
Medicare Wellness Visit, Male The best way to live healthy is to have a lifestyle where you eat a well-balanced diet, exercise regularly, limit alcohol use, and quit all forms of tobacco/nicotine, if applicable. Regular preventive services are another way to keep healthy. Preventive services (vaccines, screening tests, monitoring & exams) can help personalize your care plan, which helps you manage your own care. Screening tests can find health problems at the earliest stages, when they are easiest to treat. [facility] follows the current, evidence-based guidelines published by the [facility] ([facility]) when recommending preventive services for our patients. Because we follow these guidelines, sometimes recommendations change over time as research supports it. (For example, a prostate screening blood test is no longer routinely recommended for men with no symptoms.) Of course, you and your doctor may decide to screen more often for some diseases, based on your risk and co-morbidities (chronic disease you are already diagnosed with). Preventive services for you include: - Medicare offers their members a free annual wellness visit, which is time for you and your primary care provider to discuss and plan for your preventive service needs. Take advantage of this benefit every year! 
-All adults over age 72 should receive the recommended pneumonia vaccines. Current USPSTF guidelines recommend a series of two vaccines for the best pneumonia protection.  
-All adults should have a flu vaccine yearly and an ECG.  All adults age 61 and older should receive a shingles vaccine once in their lifetime.   
-All adults age 38-68 who are overweight should have a diabetes screening test once every three years.  
-Other screening tests & preventive services for persons with diabetes include: an eye exam to screen for diabetic retinopathy, a kidney function test, a foot exam, and stricter control over your cholesterol.  
-Cardiovascular screening for adults with routine risk involves an electrocardiogram (ECG) at intervals determined by the provider.  
-Colorectal cancer screening should be done for adults age 54-65 with no increased risk factors for colorectal cancer. There are a number of acceptable methods of screening for this type of cancer. Each test has its own benefits and drawbacks. Discuss with your provider what is most appropriate for you during your annual wellness visit. The different tests include: colonoscopy (considered the best screening method), a fecal occult blood test, a fecal DNA test, and sigmoidoscopy. 
-All adults born between [facility] should be screened once for Hepatitis C. 
-An Abdominal Aortic Aneurysm (AAA) Screening is recommended for men age 73-68 who has ever smoked in their lifetime. Here is a list of your current Health Maintenance items (your personalized list of preventive services) with a due date: 
Health Maintenance Due Topic Date Due  
 DTaP/Tdap/Td  (1 - Tdap) 11/04/1963  Shingles Vaccine (1 of 2) 11/04/1992  Diabetic Foot Care  09/20/2017 Neftali Betancourt Annual Well Visit  07/25/2019  Flu Vaccine  08/01/2019  Hemoglobin A1C    09/28/2019 Well Visit, Over 72: Care Instructions Your Care Instructions Physical exams can help you stay healthy. Your doctor has checked your overall health and may have suggested ways to take good care of yourself. He or she also may have recommended tests. At home, you can help prevent illness with healthy eating, regular exercise, and other steps. Follow-up care is a key part of your treatment and safety. Be sure to make and go to all appointments, and call your doctor if you are having problems. It's also a good idea to know your test results and keep a list of the medicines you take. How can you care for yourself at home? · Reach and stay at a healthy weight. This will lower your risk for many problems, such as obesity, diabetes, heart disease, and high blood pressure. · Get at least 30 minutes of exercise on most days of the week. Walking is a good choice. You also may want to do other activities, such as running, swimming, cycling, or playing tennis or team sports. · Do not smoke. Smoking can make health problems worse. If you need help quitting, talk to your doctor about stop-smoking programs and medicines. These can increase your chances of quitting for good. · Protect your skin from too much sun. When you're outdoors from 10 a.m. to 4 p.m., stay in the shade or cover up with clothing and a hat with a wide brim. Wear sunglasses that block UV rays. Even when it's cloudy, put broad-spectrum sunscreen (SPF 30 or higher) on any exposed skin. · See a dentist one or two times a year for checkups and to have your teeth cleaned. · Wear a seat belt in the car. Follow your doctor's advice about when to have certain tests. These tests can spot problems early. For men and women · Cholesterol. Your doctor will tell you how often to have this done based on your overall health and other things that can increase your risk for heart attack and stroke. · Blood pressure. Have your blood pressure checked during a routine doctor visit. Your doctor will tell you how often to check your blood pressure based on your age, your blood pressure results, and other factors. · Diabetes. Ask your doctor whether you should have tests for diabetes. · Vision. Experts recommend that you have yearly exams for glaucoma and other age-related eye problems. · Hearing. Tell your doctor if you notice any change in your hearing. You can have tests to find out how well you hear. · Colon cancer tests. Keep having colon cancer tests as your doctor recommends. You can have one of several types of tests. · Heart attack and stroke risk. At least every 4 to 6 years, you should have your risk for heart attack and stroke assessed. Your doctor uses factors such as your age, blood pressure, cholesterol, and whether you smoke or have diabetes to show what your risk for a heart attack or stroke is over the next 10 years. · Osteoporosis. Talk to your doctor about whether you should have a bone density test to find out whether you have thinning bones. Also ask your doctor about whether you should take calcium and vitamin D supplements. For women · Pap test and pelvic exam. You may no longer need a Pap test. Talk with your doctor about whether to stop or continue to have Pap tests. · Breast exam and mammogram. Ask how often you should have a mammogram, which is an X-ray of your breasts. A mammogram can spot breast cancer before it can be felt and when it is easiest to treat. · Thyroid disease. Talk to your doctor about whether to have your thyroid checked as part of a regular physical exam. Women have an increased chance of a thyroid problem. For men · Prostate exam. Talk to your doctor about whether you should have a blood test (called a PSA test) for prostate cancer. Experts recommend that you discuss the benefits and risks of the test with your doctor before you decide whether to have this test. Some experts say that men ages 79 and older no longer need testing. · Abdominal aortic aneurysm. Ask your doctor whether you should have a test to check for an aneurysm. You may need a test if you ever smoked or if your parent, brother, sister, or child has had an aneurysm. When should you call for help? Watch closely for changes in your health, and be sure to contact your doctor if you have any problems or symptoms that concern you. Where can you learn more? Go to http://adair-bakari.info/. Enter S184 in the search box to learn more about \"Well Visit, Over 65: Care Instructions. \" 
 Current as of: December 13, 2018 Content Version: 12.2 © 3281-7147 [employer]. Care instructions adapted under license by [employer] (which disclaims liability or warranty for this information). If you have questions about a medical condition or this instruction, always ask your healthcare professional. [address] any warranty or liability for your use of this information.

## 2019-10-16 NOTE — ACP (ADVANCE CARE PLANNING)
Advance Care Planning    Advance Care Planning (ACP) Provider Conversation Snapshot    Date of ACP Conversation: 10/16/19  Persons included in Conversation:  patient  Length of ACP Conversation in minutes:  <16 minutes (Non-Billable)    Authorized Decision Maker (if patient is incapable of making informed decisions):    This person is:   Healthcare Agent/Medical Power of  under Advance Directive            For Patients with Decision Making Capacity:   Values/Goals: Exploration of values, goals, and preferences if recovery is not expected, even with continued medical treatment in the event of:  Imminent death  Severe, permanent brain injury    Conversation Outcomes / Follow-Up Plan:   Recommended communicating the plan and making copies for the healthcare agent, personal physician, and others as appropriate (e.g., health system)

## 2019-10-16 NOTE — PROGRESS NOTES
Ulises Gan is a 70 y.o. male who presents today for Annual Wellness Visit and Establish Care  . He has a history of   Patient Active Problem List   Diagnosis Code    Seizure disorder ([facility] [address] G40.909    ED (erectile dysfunction) N52.9    Bilateral inguinal hernia K40.20    Appendicitis K37    Advanced care planning/counseling discussion Z71.89    Type 2 diabetes mellitus with hemoglobin A1c goal of less than 7.0% (HCC) E11.9    Hyperlipidemia LDL goal <100 E78.5    Essential hypertension I10    Adenomatous polyp of colon D12.6    Memory problem R41.3    Anxiety F41.9    MCI (mild cognitive impairment) G31.84   . Today patient is here for complete physical exam..     Patient is not quite sure of all his medications. He does suggest that we discussed this with his wife. Hypertension -well-controlled with current therapy. He denies any orthostasis. Hypertension ROS: taking medications as instructed, no medication side effects noted, no TIA's, no chest pain on exertion, no dyspnea on exertion, no swelling of ankles     reports that he has never smoked. He has never used smokeless tobacco.    reports that he does not drink alcohol. BP Readings from Last 2 Encounters:   10/16/19 100/70   03/28/19 130/86     Hyperlipidemia  Currently he takes 10 mg of lipitor  ROS: taking medications as instructed, no medication side effects noted  No new myalgias, no joint pains, no weakness  No TIA's, no chest pain on exertion, no dyspnea on exertion, no swelling of ankles. Lab Results   Component Value Date/Time    Cholesterol, total 144 03/28/2019 12:43 PM    HDL Cholesterol 52 03/28/2019 12:43 PM    LDL, calculated 73 03/28/2019 12:43 PM    VLDL, calculated 19 03/28/2019 12:43 PM    Triglyceride 96 03/28/2019 12:43 PM    CHOL/HDL Ratio 3.2 04/01/2010 09:09 AM       Diabetes Mellitus follow-up -decided to stop metformin at last visit due to A1c. He is not sure if he stop this or not.   Last hemoglobin a1c Lab Results   Component Value Date/Time    Hemoglobin A1c 5.7 (H) 03/28/2019 12:43 PM    Hemoglobin A1c (POC) 5.5 07/24/2018 03:30 AM     No components found for: POCA1C, HBA1C POC  medication compliance: compliant all of the time. Diabetic diet compliance: compliant most of the time. Home glucose monitoring: fasting values range not checking. Hypoglycemic episodes:  none. Further diabetic ROS: no polyuria or polydipsia, no chest pain, dyspnea or TIA's, no numbness, tingling or pain in extremities. Microalbuminuria:   Lab Results   Component Value Date/Time    Microalb/Creat ratio (ug/mg creat.) 6.9 03/28/2019 12:43 PM     Anxiety/Depression: Patient noted to have severe anxiety and depression based on neuropsychological evaluation. We discussed options including medications and therapy and we did give him a list of local therapist.  This evaluation was done due to concern over dementia. Since he notes that his mood has been still anxious. No SI/HI. After conversation with wife after visit it appears that he has been started on SSRI and will be starting this soon. Memory Loss: Per neuropsych evaluation severity of dementia is unclear due to severity of anxiety and depression. He is opted not to be started on any medications at this time. After conversation with wife over the phone patient has been started on Namenda and on Celexa. He has not yet started this. Sz d/o: Patient continues to follow with Dr. Clint Doe for seizure disorder. He continues to take Vimpat and ? increased dose of keppra. Health maintenance hx includes:  Exercise: moderately active. Form of exercise: walking. Diet: generally follows a low fat low cholesterol diet, nonsmoker, alcohol intake none  Social: Retired in nursing. At home with wife, daughter and mother in law. Cancer screening:    Colon cancer screening:  Last Colonoscopy: 2017 and was abnormal: Suggested a 5-year follow-up.    Prostate cancer screening: PSA and/or LANEY: N/A     Immunizations:     Immunization History   Administered Date(s) Administered    Influenza High Dose Vaccine PF 10/07/2015, 10/04/2016, 09/13/2017    Influenza Vaccine 10/05/2013    Influenza Vaccine (Tri) Adjuvanted 10/10/2018    Influenza Vaccine (Whole Virus) 10/30/2012    Influenza Vaccine Split 09/26/2011    Pneumococcal Conjugate (PCV-13) 08/23/2016    Pneumococcal Polysaccharide (PPSV-23) 10/10/2013      Immunization status: Tdap noted to be utd. ROS  Review of Systems   Constitutional: Negative for chills, fever and weight loss. HENT: Negative for congestion and sore throat. Eyes: Negative for blurred vision, double vision and photophobia. Respiratory: Negative for cough and shortness of breath. Cardiovascular: Negative for chest pain, palpitations and leg swelling. Gastrointestinal: Negative for abdominal pain, constipation, diarrhea, heartburn, nausea and vomiting. Genitourinary: Negative for dysuria, frequency and urgency. Musculoskeletal: Positive for back pain. Negative for joint pain, myalgias and neck pain. Skin: Negative for rash. Neurological: Negative. Negative for headaches. Endo/Heme/Allergies: Does not bruise/bleed easily. Psychiatric/Behavioral: Positive for memory loss. Negative for depression, substance abuse and suicidal ideas. The patient is not nervous/anxious and does not have insomnia. Visit Vitals  /70 (BP 1 Location: Left arm, BP Patient Position: Sitting)   Pulse 68   Temp 97.7 °F (36.5 °C) (Oral)   Resp 16   Ht 5' 9\" (1.753 m)   Wt 177 lb (80.3 kg)   SpO2 98%   BMI 26.14 kg/m²       Physical Exam   Constitutional: He is oriented to person, place, and time. He appears well-developed and well-nourished. HENT:   Head: Normocephalic and atraumatic. Eyes: Pupils are equal, round, and reactive to light. EOM are normal.   Cardiovascular: Normal rate and regular rhythm. No murmur heard.   Pulmonary/Chest: Effort normal and breath sounds normal. No respiratory distress. Musculoskeletal: Normal range of motion. He exhibits no edema. Neurological: He is alert and oriented to person, place, and time. Skin: Skin is warm and dry. He is not diaphoretic. Foot exam  - skin intact, mild dryness w no fissures, no rashes, no significant ulcers or callous formation. Sensation intact by microfilament or light touch     Psychiatric: He has a normal mood and affect. His behavior is normal.         Current Outpatient Medications   Medication Sig    citalopram (CELEXA) 10 mg tablet Take  by mouth daily.  memantine (NAMENDA) 10 mg tablet Take  by mouth daily.  atorvastatin (LIPITOR) 10 mg tablet TAKE ONE TABLET BY MOUTH DAILY    valsartan (DIOVAN) 80 mg tablet TAKE ONE TABLET BY MOUTH DAILY    VITAMIN D2 50,000 unit capsule TAKE ONE CAPSULE BY MOUTH EVERY 7 DAYS    sildenafil citrate (VIAGRA) 100 mg tablet TAKE 1 TABLET BY MOUTH AS NEEDED    acetaminophen (TYLENOL) 500 mg tablet Take 2 Tabs by mouth three (3) times daily. Indications: Arthritic Pain    lacosamide (VIMPAT) 150 mg tab tablet Take 1 Tab by mouth two (2) times a day. Max Daily Amount: 300 mg.  aspirin delayed-release (ASPIR-81) 81 mg tablet Take 81 mg by mouth nightly.  KEPPRA 1,000 mg tablet 1 in am  2 in pm  Indications: TONIC-CLONIC EPILEPSY TREATMENT ADJUNCT     No current facility-administered medications for this visit.          Past Medical History:   Diagnosis Date    Appendicitis 9/13/2012    ED (erectile dysfunction) 2/18/2009    HTN 2/18/2009    Hypercholesterolemia 2/18/2009    Seizure disorder ([facility] [address] 2/18/2009    last seizure 4-2011 focal    Stroke [facility])     hx  old lacunar infarct      Past Surgical History:   Procedure Laterality Date    COLONOSCOPY  2002    COLONOSCOPY N/A 10/23/2017    COLONOSCOPY performed by Keary Fothergill, MD at [facility] HX APPENDECTOMY  2012    [address]  2011      Social History     Tobacco Use    Smoking status: Never Smoker    Smokeless tobacco: Never Used   Substance Use Topics    Alcohol use: No      Family History   Problem Relation Age of Onset    Hypertension Mother     Stroke Mother    [facility] Dementia Father     Hypertension Sister     Hypertension Brother     Diabetes Other     Cancer Neg Hx         No Known Allergies     Assessment/Plan  Diagnoses and all orders for this visit:    1. Medicare annual wellness visit, subsequent - Piotr Pedraza was counseled on age-appropriate/ guideline-based risk prevention behaviors and screening for a 70y.o. year old   male . We also discussed adjustments in screening based on family history if necessary. Printed instructions for preventative screening guidelines and healthy behaviors given to patient with after visit summary. 2. Advanced directives, counseling/discussion    3. Essential hypertension blood pressure stable. 4. Type 2 diabetes mellitus with hemoglobin A1c goal of less than 7.0% [facility]) -wife does note that he is still on the metformin. We discussed that we can repeat this today and if still very low I would suggest stopping metformin  -     HEMOGLOBIN A1C WITH EAG  -     METABOLIC PANEL, BASIC    5. Seizure disorder [facility]) -seeing neurologist.    6. MCI (mild cognitive impairment) -after conversation with wife patient has been prescribed Namenda and will be starting a low-dose SSRI. 7. Anxiety -patient to start his low-dose SSRI. If he continues have anxiety could consider BuSpar    8. Encounter for immunization  -     INFLUENZA VACCINE INACTIVATED (IIV), SUBUNIT, ADJUVANTED, IM  -     ADMIN INFLUENZA VIRUS VAC        Follow-up and Dispositions    · Return in about 6 months (around 4/16/2020). Be Costello MD  10/16/2019    This note was created with the help of speech recognition software Brittany Haynes) and may contain some 'sound alike' errors.    This is the Subsequent Medicare Annual Wellness Exam, performed 12 months or more after the Initial AWV or the last Subsequent AWV    I have reviewed the patient's medical history in detail and updated the computerized patient record. History     Past Medical History:   Diagnosis Date    Appendicitis 9/13/2012    ED (erectile dysfunction) 2/18/2009    HTN 2/18/2009    Hypercholesterolemia 2/18/2009    Seizure disorder ([address] 2/18/2009    last seizure 4-2011 focal    Stroke [facility])     hx  old lacunar infarct      Past Surgical History:   Procedure Laterality Date    COLONOSCOPY  2002    COLONOSCOPY N/A 10/23/2017    COLONOSCOPY performed by Elnoria Cranker, MD at [address] [facility] HX APPENDECTOMY  2012    HX HERNIA REPAIR  2011     Current Outpatient Medications   Medication Sig Dispense Refill    citalopram (CELEXA) 10 mg tablet Take  by mouth daily.  memantine (NAMENDA) 10 mg tablet Take  by mouth daily.  atorvastatin (LIPITOR) 10 mg tablet TAKE ONE TABLET BY MOUTH DAILY 90 Tab 2    valsartan (DIOVAN) 80 mg tablet TAKE ONE TABLET BY MOUTH DAILY 90 Tab 1    VITAMIN D2 50,000 unit capsule TAKE ONE CAPSULE BY MOUTH EVERY 7 DAYS 12 Cap 0    sildenafil citrate (VIAGRA) 100 mg tablet TAKE 1 TABLET BY MOUTH AS NEEDED 6 Tab 5    acetaminophen (TYLENOL) 500 mg tablet Take 2 Tabs by mouth three (3) times daily. Indications: Arthritic Pain      lacosamide (VIMPAT) 150 mg tab tablet Take 1 Tab by mouth two (2) times a day. Max Daily Amount: 300 mg. 1 Tab 1    aspirin delayed-release (ASPIR-81) 81 mg tablet Take 81 mg by mouth nightly.       KEPPRA 1,000 mg tablet 1 in am  2 in pm  Indications: TONIC-CLONIC EPILEPSY TREATMENT ADJUNCT       No Known Allergies  Family History   Problem Relation Age of Onset    Hypertension Mother     Stroke Mother    [place] Dementia Father     Hypertension Sister     Hypertension Brother     Diabetes Other     Cancer Neg Hx      Social History     Tobacco Use    Smoking status: Never Smoker    Smokeless tobacco: Never Used   Substance Use Topics    Alcohol use: No     Patient Active Problem List   Diagnosis Code    Seizure disorder ([facility] [address] G40.909    ED (erectile dysfunction) N52.9    Bilateral inguinal hernia K40.20    Appendicitis K37    Advanced care planning/counseling discussion Z71.89    Type 2 diabetes mellitus with hemoglobin A1c goal of less than 7.0% (HCC) E11.9    Hyperlipidemia LDL goal <100 E78.5    Essential hypertension I10    Adenomatous polyp of colon D12.6    Memory problem R41.3    Anxiety F41.9    MCI (mild cognitive impairment) G31.84       Depression Risk Factor Screening:     3 most recent PHQ Screens 10/16/2019   Little interest or pleasure in doing things Not at all   Feeling down, depressed, irritable, or hopeless Several days   Total Score PHQ 2 1   Trouble falling or staying asleep, or sleeping too much Several days   Feeling tired or having little energy Several days   Poor appetite, weight loss, or overeating Several days   Feeling bad about yourself - or that you are a failure or have let yourself or your family down Not at all   Trouble concentrating on things such as school, work, reading, or watching TV Not at all   Moving or speaking so slowly that other people could have noticed; or the opposite being so fidgety that others notice Not at all   Thoughts of being better off dead, or hurting yourself in some way Not at all   PHQ 9 Score 4   How difficult have these problems made it for you to do your work, take care of your home and get along with others Somewhat difficult     Alcohol Risk Factor Screening: You do not drink alcohol or very rarely. Functional Ability and Level of Safety:   Hearing Loss  Hearing is good. Activities of Daily Living  The home contains: no safety equipment. Patient needs help with:  transportation, housework, managing medications and managing money    Fall Risk  Fall Risk Assessment, last 12 mths 10/16/2019   Able to walk? Yes   Fall in past 12 months?  No       Abuse Screen  Patient is not abused    Cognitive Screening   Evaluation of Cognitive Function:  Has your family/caregiver stated any concerns about your memory: no  Normal, Abnormal    Patient Care Team   Patient Care Team:  Chance Lopez MD as PCP - General (Internal Medicine)  Rosenda Ayala as Physician (Urology)    Assessment/Plan   Education and counseling provided:  Are appropriate based on today's review and evaluation  End-of-Life planning (with patient's consent)  Prostate cancer screening tests (PSA, covered annually)  Colorectal cancer screening tests    Diagnoses and all orders for this visit:    1. Medicare annual wellness visit, subsequent    2. Advanced directives, counseling/discussion    3. Essential hypertension    4. Type 2 diabetes mellitus with hemoglobin A1c goal of less than 7.0% ([facility])  -     HEMOGLOBIN A1C WITH EAG  -     METABOLIC PANEL, BASIC    5. Seizure disorder ([facility] [address]    6. MCI (mild cognitive impairment)    7. Anxiety    8.  Encounter for immunization  -     INFLUENZA VACCINE INACTIVATED (IIV), SUBUNIT, ADJUVANTED, IM  -     ADMIN INFLUENZA VIRUS VAC        Health Maintenance Due   Topic Date Due    DTaP/Tdap/Td series (1 - Tdap) 11/04/1963    Shingrix Vaccine Age 50> (1 of 2) 11/04/1992    Influenza Age 9 to Adult  08/01/2019    HEMOGLOBIN A1C Q6M  09/28/2019

## 2019-10-17 LAB
BUN SERPL-MCNC: 12 MG/DL (ref 8–27)
BUN/CREAT SERPL: 11 (ref 10–24)
CALCIUM SERPL-MCNC: 9.2 MG/DL (ref 8.6–10.2)
CHLORIDE SERPL-SCNC: 107 MMOL/L (ref 96–106)
CO2 SERPL-SCNC: 22 MMOL/L (ref 20–29)
CREAT SERPL-MCNC: 1.14 MG/DL (ref 0.76–1.27)
EST. AVERAGE GLUCOSE BLD GHB EST-MCNC: 117 MG/DL
GLUCOSE SERPL-MCNC: 85 MG/DL (ref 65–99)
HBA1C MFR BLD: 5.7 % (ref 4.8–5.6)
POTASSIUM SERPL-SCNC: 4.3 MMOL/L (ref 3.5–5.2)
SODIUM SERPL-SCNC: 144 MMOL/L (ref 134–144)

## 2020-02-06 ENCOUNTER — HOSPITAL ENCOUNTER (OUTPATIENT)
Dept: GENERAL RADIOLOGY | Age: 80
Discharge: HOME | End: 2020-02-06
Attending: INTERNAL MEDICINE
Payer: MEDICARE

## 2020-02-06 ENCOUNTER — OFFICE VISIT (OUTPATIENT)
Dept: INTERNAL MEDICINE CLINIC | Age: 80
End: 2020-02-06

## 2020-02-06 VITALS
DIASTOLIC BLOOD PRESSURE: 72 MMHG | BODY MASS INDEX: 26.36 KG/M2 | SYSTOLIC BLOOD PRESSURE: 98 MMHG | RESPIRATION RATE: 16 BRPM | OXYGEN SATURATION: 98 % | TEMPERATURE: 97.9 F | HEIGHT: 69 IN | HEART RATE: 56 BPM | WEIGHT: 178 LBS

## 2020-02-06 DIAGNOSIS — I10 ESSENTIAL HYPERTENSION: ICD-10-CM

## 2020-02-06 DIAGNOSIS — G40.909 SEIZURE DISORDER (HCC): ICD-10-CM

## 2020-02-06 DIAGNOSIS — M25.551 RIGHT HIP PAIN: ICD-10-CM

## 2020-02-06 DIAGNOSIS — G57.01 PIRIFORMIS SYNDROME OF RIGHT SIDE: Primary | ICD-10-CM

## 2020-02-06 DIAGNOSIS — E11.9 TYPE 2 DIABETES MELLITUS WITH HEMOGLOBIN A1C GOAL OF LESS THAN 7.0% (HCC): ICD-10-CM

## 2020-02-06 DIAGNOSIS — F41.9 ANXIETY: ICD-10-CM

## 2020-02-06 DIAGNOSIS — G57.01 PIRIFORMIS SYNDROME OF RIGHT SIDE: ICD-10-CM

## 2020-02-06 LAB — HBA1C MFR BLD HPLC: 5.5 %

## 2020-02-06 PROCEDURE — 73502 X-RAY EXAM HIP UNI 2-3 VIEWS: CPT

## 2020-02-06 RX ORDER — DICLOFENAC SODIUM 50 MG/1
50 TABLET, DELAYED RELEASE ORAL 2 TIMES DAILY
Qty: 28 TAB | Refills: 0 | Status: SHIPPED | OUTPATIENT
Start: 2020-02-06 | End: 2020-02-20

## 2020-02-06 RX ORDER — IBUPROFEN 200 MG
TABLET ORAL
COMMUNITY
End: 2020-02-06

## 2020-02-06 NOTE — PROGRESS NOTES
Please inform patient of no acute changes to his hip. Does have chronic arthritis of the joint.   Suggest continuing plan as we discussed and otherwise letting us know and we can get him seen by an orthopedist.

## 2020-02-06 NOTE — PROGRESS NOTES
Sohail Moss is a 70 y.o. male who presents today for Arthritis and Hip Pain  . He has a history of   Patient Active Problem List   Diagnosis Code    Seizure disorder ([facility] [address] G40.909    ED (erectile dysfunction) N52.9    Bilateral inguinal hernia K40.20    Appendicitis K37    Advanced care planning/counseling discussion Z71.89    Type 2 diabetes mellitus with hemoglobin A1c goal of less than 7.0% (HCC) E11.9    Hyperlipidemia LDL goal <100 E78.5    Essential hypertension I10    Adenomatous polyp of colon D12.6    Memory problem R41.3    Anxiety F41.9    MCI (mild cognitive impairment) G31.84   . Today patient is here for follow-up. .     Musculoskeletal pain:  He wishes to address discomfort in the Right hip at today's visit. This has been moderate in nature, gradual, starting about 8 months ago in onset. Pain with use but no  erythema of joints, injury has been noted by the patient. Self treatment: Ibuprofen . he does not have a history of osteoarthritis joint disease. Anxiety/Depression-neuropsych evaluation revealed significant anxiety. Patient has been placed on an SSRI through neurologist.  Notes that overall his mental health is doing okay. Patient is seen for anxiety disorder. Current treatment includes SSRI and no other therapies. Ongoing symptoms include: none. Patient denies: sweating, chest pain, dizziness, paresthesias, racing thoughts, feelings of losing control, difficulty concentrating, suicidal ideation. Denies substance or alcohol abuse. Reported side effects from the treatment: none. Diabetes Mellitus follow-up-patient is maintained on low-dose metformin. A1c is at 5.5 today. Last hemoglobin a1c   Lab Results   Component Value Date/Time    Hemoglobin A1c 5.7 (H) 10/16/2019 09:51 AM    Hemoglobin A1c (POC) 5.5 02/06/2020 10:55 AM     No components found for: POCA1C, HBA1C POC  medication compliance: compliant all of the time.     Diabetic diet compliance: compliant most of the time. Further diabetic ROS: no polyuria or polydipsia, no chest pain, dyspnea or TIA's, no numbness, tingling or pain in extremities. Microalbuminuria:   Lab Results   Component Value Date/Time    Microalb/Creat ratio (ug/mg creat.) 6.9 03/28/2019 12:43 PM     Sz d/o: seeing nerology. Has not had any seizures recently. ROS  Review of Systems   Constitutional: Negative for chills, fever and weight loss. HENT: Negative for congestion and sore throat. Eyes: Negative for blurred vision, double vision and photophobia. Respiratory: Negative for cough and shortness of breath. Cardiovascular: Negative for chest pain, palpitations and leg swelling. Gastrointestinal: Negative for abdominal pain, constipation, diarrhea, heartburn, nausea and vomiting. Genitourinary: Negative for dysuria, frequency and urgency. Musculoskeletal: Positive for joint pain. Negative for myalgias. Skin: Negative for rash. Neurological: Negative. Negative for headaches. Endo/Heme/Allergies: Does not bruise/bleed easily. Psychiatric/Behavioral: Positive for memory loss. Negative for suicidal ideas. The patient is nervous/anxious. Visit Vitals  BP 98/72 (BP 1 Location: Left arm, BP Patient Position: Sitting)   Pulse (!) 56   Temp 97.9 °F (36.6 °C) (Oral)   Resp 16   Ht 5' 9\" (1.753 m)   Wt 178 lb (80.7 kg)   SpO2 98%   BMI 26.29 kg/m²       Physical Exam  Constitutional:       Appearance: He is well-developed. He is not diaphoretic. HENT:      Head: Normocephalic and atraumatic. Eyes:      Pupils: Pupils are equal, round, and reactive to light. Cardiovascular:      Rate and Rhythm: Normal rate and regular rhythm. Heart sounds: No murmur. Pulmonary:      Effort: Pulmonary effort is normal. No respiratory distress. Breath sounds: Normal breath sounds. Musculoskeletal: Normal range of motion. Comments: Pain noted to the lateral hip. Patient with full range of motion of right hip. Notes that is quite tender with ambulation. Normal deep tendon reflexes. Normal strength testing to lower extremity. Skin:     General: Skin is warm and dry. Neurological:      Mental Status: He is alert and oriented to person, place, and time. Psychiatric:         Behavior: Behavior normal.           Current Outpatient Medications   Medication Sig    ibuprofen (MOTRIN) 200 mg tablet Take  by mouth.  valsartan (DIOVAN) 80 mg tablet TAKE ONE TABLET BY MOUTH DAILY    citalopram (CELEXA) 10 mg tablet Take  by mouth daily.  memantine (NAMENDA) 10 mg tablet Take  by mouth daily.  atorvastatin (LIPITOR) 10 mg tablet TAKE ONE TABLET BY MOUTH DAILY    VITAMIN D2 50,000 unit capsule TAKE ONE CAPSULE BY MOUTH EVERY 7 DAYS    lacosamide (VIMPAT) 150 mg tab tablet Take 1 Tab by mouth two (2) times a day. Max Daily Amount: 300 mg.  aspirin delayed-release (ASPIR-81) 81 mg tablet Take 81 mg by mouth nightly.  KEPPRA 1,000 mg tablet 1 in am  2 in pm  Indications: TONIC-CLONIC EPILEPSY TREATMENT ADJUNCT    sildenafil citrate (VIAGRA) 100 mg tablet TAKE 1 TABLET BY MOUTH AS NEEDED    acetaminophen (TYLENOL) 500 mg tablet Take 2 Tabs by mouth three (3) times daily. Indications: Arthritic Pain     No current facility-administered medications for this visit.          Past Medical History:   Diagnosis Date    Appendicitis 9/13/2012    ED (erectile dysfunction) 2/18/2009    HTN 2/18/2009    Hypercholesterolemia 2/18/2009    Seizure disorder ([facility] [address] 2/18/2009    last seizure 4-2011 focal    Stroke [facility])     hx  old lacunar infarct      Past Surgical History:   Procedure Laterality Date    COLONOSCOPY  2002    COLONOSCOPY N/A 10/23/2017    COLONOSCOPY performed by Martin Velazco MD at [address] [place] [address] HX APPENDECTOMY  2012    HX HERNIA REPAIR  2011      Social History     Tobacco Use    Smoking status: Never Smoker    Smokeless tobacco: Never Used   Substance Use Topics    Alcohol use: No      Family History   Problem Relation Age of Onset    Hypertension Mother     Stroke Mother    [facility] Dementia Father     Hypertension Sister     Hypertension Brother     Diabetes Other     Cancer Neg Hx         No Known Allergies     Assessment/Plan  Diagnoses and all orders for this visit:    1. Piriformis syndrome of right side-patient symptoms consistent with piriformis syndrome. We will get an x-ray of right hip. We discussed using anti-inflammatories for coming 10 to 14 days. We have provided him with exercises to do. If symptoms persist would suggest seeing physical therapy versus orthopedist.  -     diclofenac EC (VOLTAREN) 50 mg EC tablet; Take 1 Tab by mouth two (2) times a day for 14 days. -     XR HIP RT W OR WO PELV 2-3 VWS; Future    2. Type 2 diabetes mellitus with hemoglobin A1c goal of less than 7.0% ([facility])-A1c at goal  -     AMB POC HEMOGLOBIN A1C    3. Essential hypertension-stable with current therapy. 4. Seizure disorder ([facility] [address]    5. Right hip pain  -     diclofenac EC (VOLTAREN) 50 mg EC tablet; Take 1 Tab by mouth two (2) times a day for 14 days. -     XR HIP RT W OR WO PELV 2-3 VWS; Future    6. Anxiety-working with neurologist.  Notes that his anxiety is overall better. Nichol Crisostomo MD  2/6/2020    This note was created with the help of speech recognition software Naz Isbell) and may contain some 'sound alike' errors.

## 2020-02-06 NOTE — PATIENT INSTRUCTIONS
Piriformis Syndrome: Exercises Introduction Here are some examples of exercises for you to try. The exercises may be suggested for a condition or for rehabilitation. Start each exercise slowly. Ease off the exercises if you start to have pain. You will be told when to start these exercises and which ones will work best for you. How to do the exercises Hip rotator stretch 1. Lie on your back with both knees bent and your feet flat on the floor. 2. Put the ankle of your affected leg on your opposite thigh near your knee. 3. Use your hand to gently push your knee (on your affected leg) away from your body until you feel a gentle stretch around your hip. 4. Hold the stretch for 15 to 30 seconds. 5. Repeat 2 to 4 times. 6. Switch legs and repeat steps 1 through 5. Piriformis stretch 1. Lie on your back with your legs straight. 2. Lift your affected leg and bend your knee. With your opposite hand, reach across your body, and then gently pull your knee toward your opposite shoulder. 3. Hold the stretch for 15 to 30 seconds. 4. Repeat with your other leg. 5. Repeat 2 to 4 times on each side. Lower abdominal strengthening 1. Lie on your back with your knees bent and your feet flat on the floor. 2. Tighten your belly muscles by pulling your belly button in toward your spine. 3. Lift one foot off the floor and bring your knee toward your chest, so that your knee is straight above your hip and your leg is bent like the letter \"L. \" 
4. Lift the other knee up to the same position. 5. Lower one leg at a time to the starting position. 6. Keep alternating legs until you have lifted each leg 8 to 12 times. 7. Be sure to keep your belly muscles tight and your back still as you are moving your legs. Be sure to breathe normally. Follow-up care is a key part of your treatment and safety.  Be sure to make and go to all appointments, and call your doctor if you are having problems. It's also a good idea to know your test results and keep a list of the medicines you take. Current as of: June 26, 2019 Content Version: 12.2 © 9343-8144 [employer]. Care instructions adapted under license by [employer] (which disclaims liability or warranty for this information). If you have questions about a medical condition or this instruction, always ask your healthcare professional. [place] [address] any warranty or liability for your use of this information.

## 2020-03-22 RX ORDER — ATORVASTATIN CALCIUM 10 MG/1
TABLET, FILM COATED ORAL
Qty: 90 TAB | Refills: 1 | Status: SHIPPED | OUTPATIENT
Start: 2020-03-22 | End: 2020-09-21

## 2020-06-20 RX ORDER — METFORMIN HYDROCHLORIDE 500 MG/1
TABLET, EXTENDED RELEASE ORAL
Qty: 90 TAB | Refills: 2 | Status: SHIPPED | OUTPATIENT
Start: 2020-06-20 | End: 2021-03-30

## 2020-08-28 ENCOUNTER — VIRTUAL VISIT (OUTPATIENT)
Dept: INTERNAL MEDICINE CLINIC | Age: 80
End: 2020-08-28
Payer: MEDICARE

## 2020-08-28 DIAGNOSIS — R53.1 RIGHT SIDED WEAKNESS: ICD-10-CM

## 2020-08-28 DIAGNOSIS — E11.9 TYPE 2 DIABETES MELLITUS WITH HEMOGLOBIN A1C GOAL OF LESS THAN 7.0% (HCC): ICD-10-CM

## 2020-08-28 DIAGNOSIS — I10 ESSENTIAL HYPERTENSION: ICD-10-CM

## 2020-08-28 DIAGNOSIS — M51.36 DDD (DEGENERATIVE DISC DISEASE), LUMBAR: ICD-10-CM

## 2020-08-28 DIAGNOSIS — Z09 HOSPITAL DISCHARGE FOLLOW-UP: Primary | ICD-10-CM

## 2020-08-28 DIAGNOSIS — G31.84 MCI (MILD COGNITIVE IMPAIRMENT): ICD-10-CM

## 2020-08-28 PROCEDURE — G8756 NO BP MEASURE DOC: HCPCS | Performed by: INTERNAL MEDICINE

## 2020-08-28 PROCEDURE — G8419 CALC BMI OUT NRM PARAM NOF/U: HCPCS | Performed by: INTERNAL MEDICINE

## 2020-08-28 PROCEDURE — G8427 DOCREV CUR MEDS BY ELIG CLIN: HCPCS | Performed by: INTERNAL MEDICINE

## 2020-08-28 PROCEDURE — G0463 HOSPITAL OUTPT CLINIC VISIT: HCPCS | Performed by: INTERNAL MEDICINE

## 2020-08-28 PROCEDURE — G8432 DEP SCR NOT DOC, RNG: HCPCS | Performed by: INTERNAL MEDICINE

## 2020-08-28 PROCEDURE — 1101F PT FALLS ASSESS-DOCD LE1/YR: CPT | Performed by: INTERNAL MEDICINE

## 2020-08-28 PROCEDURE — 99214 OFFICE O/P EST MOD 30 MIN: CPT | Performed by: INTERNAL MEDICINE

## 2020-08-28 PROCEDURE — G8536 NO DOC ELDER MAL SCRN: HCPCS | Performed by: INTERNAL MEDICINE

## 2020-08-28 NOTE — PROGRESS NOTES
Ke Barrios was seen on 8/28/2020 using synchronous (real-time) audio-video technology; doxy. me. Consent: Ke Barrios, who was seen by synchronous (real-time) audio-video technology, and/or his healthcare decision maker, is aware that this patient-initiated, Telehealth encounter on 8/28/2020 is a billable service, with coverage as determined by his insurance carrier. He is aware that he may receive a bill and has provided verbal consent to proceed: Yes. I was in the office while conducting this encounter. Ke Barrios is a 70 y.o. male who presents today for Hospital Follow Up  . He has a history of   Patient Active Problem List   Diagnosis Code    Seizure disorder ([facility] [address] G40.909    ED (erectile dysfunction) N52.9    Bilateral inguinal hernia K40.20    Appendicitis K37    Advanced care planning/counseling discussion Z71.89    Type 2 diabetes mellitus with hemoglobin A1c goal of less than 7.0% (HCC) E11.9    Hyperlipidemia LDL goal <100 E78.5    Essential hypertension I10    Adenomatous polyp of colon D12.6    Memory problem R41.3    Anxiety F41.9    MCI (mild cognitive impairment) G31.84   . Today patient is being seen for hospital follow-up. Hospitalization: [facility] and then admitted to rehab from the 12th of the 26. Initial concern was due to increased weakness and falls. Patient had head CT head neck CTA which did show moderate left-sided stenosis. Patient had MRI of the lumbar spine which did show L4-5 impingement with left-sided foraminal narrowing at L5-S1. Neurology was consulted and recommended 3-month follow-up for MRI but did not believe that MRI abnormalities were the cause of symptoms. Per rehab note it appears that patient did slowly improve right leg weakness improved and he was sent home with home health. He is supposed to follow-up with pain management Dr. Marianna Bang.   They have not yet done so as they want to see what physical therapy would do. He has had one fall since being home. This was several weeks ago and was mechanical fall. Slowly has been improving. This is both on the level of pain and weakness. They are interested in doing outpatient physical therapy. He is taking acetaminophen for his pain management. Has been d/c from [facility] yesterday. DM: Continues to take metformin. Not checking blood sugars. Hypertension-patient remains on ARB. Hypertension ROS: taking medications as instructed, no medication side effects noted, no TIA's, no chest pain on exertion, no dyspnea on exertion, no swelling of ankles     reports that he has never smoked. He has never used smokeless tobacco.    reports no history of alcohol use. BP Readings from Last 2 Encounters:   02/06/20 98/72   10/16/19 100/70     Dementia/Depression: Continues to follow with Dr. Karin Serrano. ROS  Review of Systems   Constitutional: Negative for chills, fever and weight loss. HENT: Negative for congestion and sore throat. Eyes: Negative for blurred vision, double vision and photophobia. Respiratory: Negative for cough and shortness of breath. Cardiovascular: Negative for chest pain, palpitations and leg swelling. Gastrointestinal: Negative for abdominal pain, constipation, diarrhea, heartburn, nausea and vomiting. Genitourinary: Negative for dysuria, frequency and urgency. Musculoskeletal: Positive for joint pain. Negative for myalgias. Skin: Negative for rash. Neurological: Positive for weakness (stable to improved). Negative for dizziness, tingling, tremors, sensory change, speech change, focal weakness, seizures, loss of consciousness and headaches. Endo/Heme/Allergies: Does not bruise/bleed easily. Psychiatric/Behavioral: Positive for memory loss. Negative for depression and suicidal ideas. The patient is nervous/anxious. There were no vitals taken for this visit.      Patient-Reported Vitals 8/28/2020 Patient-Reported Weight 178lbs   Patient-Reported Pulse 78   Patient-Reported Systolic  155   Patient-Reported Diastolic 84        Physical Exam  Constitutional:       Appearance: Normal appearance. HENT:      Head: Normocephalic and atraumatic. Pulmonary:      Effort: Pulmonary effort is normal.   Neurological:      General: No focal deficit present. Mental Status: He is alert. Psychiatric:         Mood and Affect: Mood normal.         Behavior: Behavior normal.         Thought Content: Thought content normal.           Current Outpatient Medications   Medication Sig    metFORMIN ER (GLUCOPHAGE XR) 500 mg tablet TAKE 1 TABLET BY MOUTH DAILY WITH DINNER    valsartan (DIOVAN) 80 mg tablet TAKE ONE TABLET BY MOUTH DAILY    atorvastatin (LIPITOR) 10 mg tablet TAKE 1 TABLET BY MOUTH DAILY    citalopram (CELEXA) 10 mg tablet Take  by mouth daily.  memantine (NAMENDA) 10 mg tablet Take  by mouth two (2) times a day.  VITAMIN D2 50,000 unit capsule TAKE ONE CAPSULE BY MOUTH EVERY 7 DAYS    lacosamide (VIMPAT) 150 mg tab tablet Take 1 Tab by mouth two (2) times a day. Max Daily Amount: 300 mg.  aspirin delayed-release (ASPIR-81) 81 mg tablet Take 81 mg by mouth nightly.  KEPPRA 1,000 mg tablet 1 in am  2 in pm  Indications: TONIC-CLONIC EPILEPSY TREATMENT ADJUNCT    acetaminophen (TYLENOL) 500 mg tablet Take 2 Tabs by mouth three (3) times daily. Indications: Arthritic Pain     No current facility-administered medications for this visit.          Past Medical History:   Diagnosis Date    Appendicitis 9/13/2012    ED (erectile dysfunction) 2/18/2009    HTN 2/18/2009    Hypercholesterolemia 2/18/2009    Seizure disorder ([address] 2/18/2009    last seizure 4-2011 focal    Stroke [facility])     hx  old lacunar infarct      Past Surgical History:   Procedure Laterality Date    COLONOSCOPY  2002    COLONOSCOPY N/A 10/23/2017    COLONOSCOPY performed by Keary Fothergill, MD at [address] HX APPENDECTOMY  2012    HX HERNIA REPAIR  2011      Social History     Tobacco Use    Smoking status: Never Smoker    Smokeless tobacco: Never Used   Substance Use Topics    Alcohol use: No      Family History   Problem Relation Age of Onset    Hypertension Mother     Stroke Mother    Shamir.[place] Dementia Father     Hypertension Sister     Hypertension Brother     Diabetes Other     Cancer Neg Hx         No Known Allergies     Assessment/Plan  Diagnoses and all orders for this visit:    1. Hospital discharge [id number] patient slowly improving. He will follow-up with neurologist tomorrow. He has been discharged from home health. He did sustain 1 fall but none since. As he is continuing to slowly improve, he has not gotten in with pain management. I agree with outpatient physical therapy and they would like to go to select physical therapy which is near their home. We will fax a referral there. We discussed that if his progress starts diminishing or his weakness increases we would have a low threshold to be more aggressive with physical therapy and get him in with pain management. Continue close monitoring. 2. Essential hypertension-stable with current therapy    3. Type 2 diabetes mellitus with hemoglobin A1c goal of less than 7.0% ([facility])-very well controlled. Wife hesitant to remove metformin due to poor eating habits. 4. Right sided weakness  -     REFERRAL TO PHYSICAL THERAPY    5. DDD (degenerative disc disease), lumbar  -     REFERRAL TO PHYSICAL THERAPY    6. MCI (mild cognitive impairment)-following with neurologist tomorrow            Negar Lyons is a 70 y.o. male being evaluated by a video visit encounter for concerns as above. A caregiver was present when appropriate. Due to this being a TeleHealth encounter (During [facility]- public health emergency), evaluation of the following organ systems was limited: Vitals/Constitutional/EENT/Resp/CV/GI//MS/Neuro/Skin/Heme-Lymph-Imm.   Pursuant to the emergency declaration under the [facility] waiver authority and the [employer] Act, this Virtual  Visit was conducted, with patient's (and/or legal guardian's) consent, to reduce the patient's risk of exposure to COVID-19 and provide necessary medical care. Services were provided through a video synchronous discussion virtually to substitute for in-person clinic visit. Patient and provider were located at their individual homes. Joselyn Leal MD  8/28/2020    This note was created with the help of speech recognition software [employer]) and may contain some 'sound alike' errors.

## 2020-09-21 RX ORDER — ATORVASTATIN CALCIUM 10 MG/1
TABLET, FILM COATED ORAL
Qty: 90 TAB | Refills: 0 | Status: SHIPPED | OUTPATIENT
Start: 2020-09-21 | End: 2020-12-16

## 2020-11-07 DIAGNOSIS — I10 ESSENTIAL HYPERTENSION: ICD-10-CM

## 2020-11-07 RX ORDER — VALSARTAN 80 MG/1
TABLET ORAL
Qty: 30 TAB | Refills: 0 | Status: SHIPPED | OUTPATIENT
Start: 2020-11-07 | End: 2020-12-07

## 2021-03-30 RX ORDER — METFORMIN HYDROCHLORIDE 500 MG/1
TABLET, EXTENDED RELEASE ORAL
Qty: 90 TAB | Refills: 1 | Status: SHIPPED | OUTPATIENT
Start: 2021-03-30 | End: 2021-09-27

## 2021-06-21 RX ORDER — ATORVASTATIN CALCIUM 10 MG/1
TABLET, FILM COATED ORAL
Qty: 90 TABLET | Refills: 0 | Status: SHIPPED | OUTPATIENT
Start: 2021-06-21 | End: 2021-09-19

## 2021-07-07 DIAGNOSIS — I10 ESSENTIAL HYPERTENSION: ICD-10-CM

## 2021-07-07 RX ORDER — VALSARTAN 80 MG/1
TABLET ORAL
Qty: 30 TABLET | Refills: 0 | Status: SHIPPED | OUTPATIENT
Start: 2021-07-07 | End: 2021-08-06

## 2021-08-04 ENCOUNTER — OFFICE VISIT (OUTPATIENT)
Dept: INTERNAL MEDICINE CLINIC | Age: 81
End: 2021-08-04
Payer: MEDICARE

## 2021-08-04 VITALS
SYSTOLIC BLOOD PRESSURE: 122 MMHG | DIASTOLIC BLOOD PRESSURE: 84 MMHG | HEIGHT: 69 IN | WEIGHT: 188.4 LBS | RESPIRATION RATE: 14 BRPM | BODY MASS INDEX: 27.91 KG/M2 | OXYGEN SATURATION: 99 % | TEMPERATURE: 97.9 F | HEART RATE: 58 BPM

## 2021-08-04 DIAGNOSIS — I10 ESSENTIAL HYPERTENSION: Primary | ICD-10-CM

## 2021-08-04 DIAGNOSIS — G30.1 LATE ONSET ALZHEIMER'S DISEASE WITHOUT BEHAVIORAL DISTURBANCE (HCC): ICD-10-CM

## 2021-08-04 DIAGNOSIS — F32.1 MODERATE MAJOR DEPRESSION (HCC): ICD-10-CM

## 2021-08-04 DIAGNOSIS — G40.909 SEIZURE DISORDER (HCC): ICD-10-CM

## 2021-08-04 DIAGNOSIS — E78.5 HYPERLIPIDEMIA LDL GOAL <100: ICD-10-CM

## 2021-08-04 DIAGNOSIS — E11.9 TYPE 2 DIABETES MELLITUS WITH HEMOGLOBIN A1C GOAL OF LESS THAN 7.0% (HCC): ICD-10-CM

## 2021-08-04 DIAGNOSIS — M51.36 DDD (DEGENERATIVE DISC DISEASE), LUMBAR: ICD-10-CM

## 2021-08-04 DIAGNOSIS — F41.9 ANXIETY: ICD-10-CM

## 2021-08-04 DIAGNOSIS — F02.80 LATE ONSET ALZHEIMER'S DISEASE WITHOUT BEHAVIORAL DISTURBANCE (HCC): ICD-10-CM

## 2021-08-04 LAB
COMMENT, HOLDF: NORMAL
SAMPLES BEING HELD,HOLD: NORMAL

## 2021-08-04 PROCEDURE — G8510 SCR DEP NEG, NO PLAN REQD: HCPCS | Performed by: INTERNAL MEDICINE

## 2021-08-04 PROCEDURE — G8536 NO DOC ELDER MAL SCRN: HCPCS | Performed by: INTERNAL MEDICINE

## 2021-08-04 PROCEDURE — G8754 DIAS BP LESS 90: HCPCS | Performed by: INTERNAL MEDICINE

## 2021-08-04 PROCEDURE — 1101F PT FALLS ASSESS-DOCD LE1/YR: CPT | Performed by: INTERNAL MEDICINE

## 2021-08-04 PROCEDURE — G8419 CALC BMI OUT NRM PARAM NOF/U: HCPCS | Performed by: INTERNAL MEDICINE

## 2021-08-04 PROCEDURE — 99214 OFFICE O/P EST MOD 30 MIN: CPT | Performed by: INTERNAL MEDICINE

## 2021-08-04 PROCEDURE — G8427 DOCREV CUR MEDS BY ELIG CLIN: HCPCS | Performed by: INTERNAL MEDICINE

## 2021-08-04 PROCEDURE — G0463 HOSPITAL OUTPT CLINIC VISIT: HCPCS | Performed by: INTERNAL MEDICINE

## 2021-08-04 PROCEDURE — G8752 SYS BP LESS 140: HCPCS | Performed by: INTERNAL MEDICINE

## 2021-08-04 RX ORDER — FLUTICASONE PROPIONATE 50 MCG
2 SPRAY, SUSPENSION (ML) NASAL DAILY
Qty: 1 BOTTLE | Refills: 3 | Status: SHIPPED | OUTPATIENT
Start: 2021-08-04

## 2021-08-04 NOTE — PROGRESS NOTES
Ivan Cross  Identified pt with two pt identifiers(name and ). Chief Complaint   Patient presents with    Follow-up     RM19// pt presents today for routine chk up; has had a dry cough x month       1. Have you been to the ER, urgent care clinic since your last visit? Hospitalized since your last visit? NO    2. Have you seen or consulted any other health care providers outside of the [address] [place] [address] since your last visit? Include any pap smears or colon screening. NO      Provider notified of reason for visit, vitals and flowsheets obtained on patients.      Patient received paperwork for advance directive during previous visit but has not completed at this time     Reviewed record In preparation for visit, huddled with provider and have obtained necessary documentation      Health Maintenance Due   Topic    COVID-19 Vaccine (1)    DTaP/Tdap/Td series (1 - Tdap)    Shingrix Vaccine Age 49> (1 of 2)    Eye Exam Retinal or Dilated     MICROALBUMIN Q1     Lipid Screen     A1C test (Diabetic or Prediabetic)     Medicare Yearly Exam     Foot Exam Q1        Wt Readings from Last 3 Encounters:   21 188 lb 6.4 oz (85.5 kg)   20 178 lb (80.7 kg)   10/16/19 177 lb (80.3 kg)     Temp Readings from Last 3 Encounters:   21 97.9 °F (36.6 °C) (Oral)   20 97.9 °F (36.6 °C) (Oral)   10/16/19 97.7 °F (36.5 °C) (Oral)     BP Readings from Last 3 Encounters:   21 122/84   20 98/72   10/16/19 100/70     Pulse Readings from Last 3 Encounters:   21 (!) 58   20 (!) 56   10/16/19 68     Vitals:    21 1102   BP: 122/84   Pulse: (!) 58   Resp: 14   Temp: 97.9 °F (36.6 °C)   TempSrc: Oral   SpO2: 99%   Weight: 188 lb 6.4 oz (85.5 kg)   Height: 5' 9\" (1.753 m)   PainSc:   0 - No pain         Learning Assessment:  :     Learning Assessment 2014   PRIMARY LEARNER Patient   PRIMARY LANGUAGE ENGLISH   LEARNER PREFERENCE PRIMARY DEMONSTRATION   ANSWERED BY self RELATIONSHIP SELF       Depression Screening:  :     3 most recent PHQ Screens 8/4/2021   Little interest or pleasure in doing things Not at all   Feeling down, depressed, irritable, or hopeless Not at all   Total Score PHQ 2 0   Trouble falling or staying asleep, or sleeping too much -   Feeling tired or having little energy -   Poor appetite, weight loss, or overeating -   Feeling bad about yourself - or that you are a failure or have let yourself or your family down -   Trouble concentrating on things such as school, work, reading, or watching TV -   Moving or speaking so slowly that other people could have noticed; or the opposite being so fidgety that others notice -   Thoughts of being better off dead, or hurting yourself in some way -   PHQ 9 Score -   How difficult have these problems made it for you to do your work, take care of your home and get along with others -       Fall Risk Assessment:  :     Fall Risk Assessment, last 12 mths 8/4/2021   Able to walk? Yes   Fall in past 12 months? 1   Do you feel unsteady? 1   Are you worried about falling 1   Is TUG test greater than 12 seconds? 0   Is the gait abnormal? 0   Number of falls in past 12 months 1   Fall with injury? 0       Abuse Screening:  :     Abuse Screening Questionnaire 2/6/2020 10/16/2019 3/28/2019 7/25/2018 7/20/2017   Do you ever feel afraid of your partner? N N N N N   Are you in a relationship with someone who physically or mentally threatens you? N N N N N   Is it safe for you to go home?  Y Y Y Y Y       ADL Screening:  :     ADL Assessment 7/25/2018   Feeding yourself No Help Needed   Getting from bed to chair No Help Needed   Getting dressed No Help Needed   Bathing or showering No Help Needed   Walk across the room (includes cane/walker) No Help Needed   Using the telphone No Help Needed   Taking your medications No Help Needed   Preparing meals No Help Needed   Managing money (expenses/bills) Help Needed   Moderately strenuous housework (laundry) No Help Needed   Shopping for personal items (toiletries/medicines) No Help Needed   Shopping for groceries No Help Needed   Driving Help Needed   Climbing a flight of stairs No Help Needed   Getting to places beyond walking distances Help Needed         Medication reconciliation up to date and corrected with patient at this time.

## 2021-08-04 NOTE — PROGRESS NOTES
Chela Ortiz is a 68 y.o. male who presents today for Follow-up (RM19// pt presents today for routine chk up; has had a dry cough x month)  . He has a history of   Patient Active Problem List   Diagnosis Code    Seizure disorder ([facility] [address] G40.909    ED (erectile dysfunction) N52.9    Bilateral inguinal hernia K40.20    Appendicitis K37    Advanced care planning/counseling discussion Z71.89    Type 2 diabetes mellitus with hemoglobin A1c goal of less than 7.0% (HCC) E11.9    Hyperlipidemia LDL goal <100 E78.5    Essential hypertension I10    Adenomatous polyp of colon D12.6    Memory problem R41.3    Anxiety F41.9    MCI (mild cognitive impairment) G31.84   . Today patient is here for follow-up. Lena Adler Has had a dry irritated cough for the last several months. Mainly in the evening. He does tend to spend a lot of time on her porch. Denies any significant history of environmental allergies. Denies any chest pain. Denies any dyspnea on exertion. Lumbar DDD: doing well overall. Still in PT. still some right leg weakness but overall improved. Diabetes Mellitus follow-up  Last hemoglobin a1c   Lab Results   Component Value Date/Time    Hemoglobin A1c 5.7 (H) 10/16/2019 09:51 AM    Hemoglobin A1c (POC) 5.5 02/06/2020 10:55 AM     No components found for: POCA1C, HBA1C POC  medication compliance: compliant all of the time. Diabetic diet compliance: compliant most of the time. Further diabetic ROS: no polyuria or polydipsia, no chest pain, dyspnea or TIA's, no numbness, tingling or pain in extremities. Microalbuminuria:   Lab Results   Component Value Date/Time    Microalb/Creat ratio (ug/mg creat.) 6.9 03/28/2019 12:43 PM     Dementia: Patient continues to follow with neurologist.  He is still on Aricept Namenda and an SSRI. He reports that overall his mental health has been stable. Also followed for seizure disorder. He has not had any seizures recently.   Remains on Keppra and Vimpat. Hypertension  Hypertension ROS: taking medications as instructed, no medication side effects noted, no TIA's, no chest pain on exertion, no dyspnea on exertion, no swelling of ankles     reports that he has never smoked. He has never used smokeless tobacco.    reports no history of alcohol use. BP Readings from Last 2 Encounters:   08/04/21 122/84   02/06/20 98/72     Hyperlipidemia  On statin. ROS: taking medications as instructed, no medication side effects noted  No new myalgias, no joint pains, no weakness  No TIA's, no chest pain on exertion, no dyspnea on exertion, no swelling of ankles. Lab Results   Component Value Date/Time    Cholesterol, total 144 03/28/2019 12:43 PM    HDL Cholesterol 52 03/28/2019 12:43 PM    LDL, calculated 73 03/28/2019 12:43 PM    VLDL, calculated 19 03/28/2019 12:43 PM    Triglyceride 96 03/28/2019 12:43 PM    CHOL/HDL Ratio 3.2 04/01/2010 09:09 AM         ROS  Review of Systems   Constitutional: Negative for chills, fever and weight loss. HENT: Negative for congestion and sore throat. Eyes: Negative for blurred vision, double vision and photophobia. Respiratory: Negative for cough and shortness of breath. Cardiovascular: Negative for chest pain, palpitations and leg swelling. Gastrointestinal: Negative for abdominal pain, constipation, diarrhea, heartburn, nausea and vomiting. Genitourinary: Negative for dysuria, frequency and urgency. Musculoskeletal: Positive for back pain. Negative for joint pain and myalgias. R lower leg   Skin: Negative for rash. Neurological: Negative. Negative for headaches. Generalized weakness to right lower extremity. Endo/Heme/Allergies: Does not bruise/bleed easily. Psychiatric/Behavioral: Positive for depression (Notes that anx/dep stable) and memory loss. Negative for suicidal ideas.        Visit Vitals  /84 (BP 1 Location: Left upper arm, BP Patient Position: Sitting, BP Cuff Size: Adult) Pulse (!) 58   Temp 97.9 °F (36.6 °C) (Oral)   Resp 14   Ht 5' 9\" (1.753 m)   Wt 188 lb 6.4 oz (85.5 kg)   SpO2 99%   BMI 27.82 kg/m²       Physical Exam  Constitutional:       Appearance: He is well-developed. He is not diaphoretic. HENT:      Head: Normocephalic and atraumatic. Right Ear: Tympanic membrane normal.      Left Ear: Tympanic membrane normal.      Nose: Nose normal.      Mouth/Throat:      Mouth: Mucous membranes are moist.   Eyes:      Pupils: Pupils are equal, round, and reactive to light. Neck:      Vascular: No JVD. Cardiovascular:      Rate and Rhythm: Normal rate and regular rhythm. Heart sounds: No murmur heard. Pulmonary:      Effort: Pulmonary effort is normal. No respiratory distress. Breath sounds: Normal breath sounds. No wheezing. Abdominal:      General: Bowel sounds are normal. There is no distension. Palpations: Abdomen is soft. Tenderness: There is no abdominal tenderness. Musculoskeletal:         General: Normal range of motion. Cervical back: Normal range of motion and neck supple. Skin:     General: Skin is warm and dry. Neurological:      Mental Status: He is alert and oriented to person, place, and time. Cranial Nerves: No cranial nerve deficit. Psychiatric:         Behavior: Behavior normal.           Current Outpatient Medications   Medication Sig    valsartan (DIOVAN) 80 mg tablet TAKE ONE TABLET BY MOUTH DAILY    atorvastatin (LIPITOR) 10 mg tablet TAKE ONE TABLET BY MOUTH DAILY    metFORMIN ER (GLUCOPHAGE XR) 500 mg tablet TAKE ONE TABLET BY MOUTH DAILY WITH DINNER    citalopram (CELEXA) 10 mg tablet Take  by mouth daily.  memantine (NAMENDA) 10 mg tablet Take  by mouth two (2) times a day.  VITAMIN D2 50,000 unit capsule TAKE ONE CAPSULE BY MOUTH EVERY 7 DAYS    acetaminophen (TYLENOL) 500 mg tablet Take 2 Tabs by mouth three (3) times daily.  Indications: Arthritic Pain    lacosamide (VIMPAT) 150 mg tab tablet Take 1 Tab by mouth two (2) times a day. Max Daily Amount: 300 mg.  aspirin delayed-release (ASPIR-81) 81 mg tablet Take 81 mg by mouth nightly.  KEPPRA 1,000 mg tablet 1 in am  2 in pm  Indications: TONIC-CLONIC EPILEPSY TREATMENT ADJUNCT     No current facility-administered medications for this visit. Past Medical History:   Diagnosis Date    Appendicitis 9/13/2012    ED (erectile dysfunction) 2/18/2009    HTN 2/18/2009    Hypercholesterolemia 2/18/2009    Seizure disorder ([address] 2/18/2009    last seizure 4-2011 focal    Stroke [facility])     hx  old lacunar infarct      Past Surgical History:   Procedure Laterality Date    COLONOSCOPY  2002    COLONOSCOPY N/A 10/23/2017    COL[facility] performed by Josie Quach MD at [address] [facility] HX APPENDECTOMY  2012    HX HERNIA REPAIR  2011      Social History     Tobacco Use    Smoking status: Never Smoker    Smokeless tobacco: Never Used   Substance Use Topics    Alcohol use: No      Family History   Problem Relation Age of Onset    Hypertension Mother     Stroke Mother    [place] Dementia Father     Hypertension Sister     Hypertension Brother     Diabetes Other     Cancer Neg Hx         No Known Allergies     Assessment/Plan  Diagnoses and all orders for this visit:    1. Essential hypertension-blood pressure stable. 2. Type 2 diabetes mellitus with hemoglobin A1c goal of less than 7.0% (HCC)-blood sugars have been at goal for some time. We have discussed stopping Metformin in the past.  Wife has been resistant to this.  -     HEMOGLOBIN A1C WITH EAG; Future  -     MICROALBUMIN, UR, RAND W/ MICROALB/CREAT RATIO; Future  -     VITAMIN B12; Future    3. Hyperlipidemia LDL goal <100-repeat lipids  -     METABOLIC PANEL, COMPREHENSIVE; Future  -     LIPID PANEL; Future    4. Anxiety-follows with neurology. Reports a mental health stable    5. Late onset Alzheimer's disease without behavioral disturbance (HCC)-memory stable. Home safe    6. Moderate major depression (HCC)-stable    7. Seizure disorder (HCC)-no recent seizures    8. DDD (degenerative disc disease), lumbar    Other orders-postnasal drip is likely cause of subacute to chronic cough. We will try nasal steroids. Physical exam negative. Patient to see us back if this does not improve. -     fluticasone propionate (FLONASE) 50 mcg/actuation nasal spray; 2 Sprays by Both Nostrils route daily. Elisabeth Hill MD  8/4/2021    This note was created with the help of speech recognition software [employer]akiya Boo) and may contain some 'sound alike' errors.

## 2021-08-05 LAB
ALBUMIN SERPL-MCNC: 4.2 G/DL (ref 3.5–5)
ALBUMIN/GLOB SERPL: 1.1 {RATIO} (ref 1.1–2.2)
ALP SERPL-CCNC: 83 U/L (ref 45–117)
ALT SERPL-CCNC: 41 U/L (ref 12–78)
ANION GAP SERPL CALC-SCNC: 6 MMOL/L (ref 5–15)
AST SERPL-CCNC: 26 U/L (ref 15–37)
BILIRUB SERPL-MCNC: 0.6 MG/DL (ref 0.2–1)
BUN SERPL-MCNC: 16 MG/DL (ref 6–20)
BUN/CREAT SERPL: 13 (ref 12–20)
CALCIUM SERPL-MCNC: 9.5 MG/DL (ref 8.5–10.1)
CHLORIDE SERPL-SCNC: 105 MMOL/L (ref 97–108)
CHOLEST SERPL-MCNC: 165 MG/DL
CO2 SERPL-SCNC: 28 MMOL/L (ref 21–32)
CREAT SERPL-MCNC: 1.2 MG/DL (ref 0.7–1.3)
CREAT UR-MCNC: 186 MG/DL
EST. AVERAGE GLUCOSE BLD GHB EST-MCNC: 126 MG/DL
GLOBULIN SER CALC-MCNC: 3.8 G/DL (ref 2–4)
GLUCOSE SERPL-MCNC: 78 MG/DL (ref 65–100)
HBA1C MFR BLD: 6 % (ref 4–5.6)
HDLC SERPL-MCNC: 57 MG/DL
HDLC SERPL: 2.9 {RATIO} (ref 0–5)
LDLC SERPL CALC-MCNC: 83.2 MG/DL (ref 0–100)
MICROALBUMIN UR-MCNC: 0.78 MG/DL
MICROALBUMIN/CREAT UR-RTO: 4 MG/G (ref 0–30)
POTASSIUM SERPL-SCNC: 4.4 MMOL/L (ref 3.5–5.1)
PROT SERPL-MCNC: 8 G/DL (ref 6.4–8.2)
SODIUM SERPL-SCNC: 139 MMOL/L (ref 136–145)
TRIGL SERPL-MCNC: 124 MG/DL (ref ?–150)
VIT B12 SERPL-MCNC: 365 PG/ML (ref 193–986)
VLDLC SERPL CALC-MCNC: 24.8 MG/DL

## 2021-09-19 RX ORDER — ATORVASTATIN CALCIUM 10 MG/1
TABLET, FILM COATED ORAL
Qty: 90 TABLET | Refills: 0 | Status: SHIPPED | OUTPATIENT
Start: 2021-09-19 | End: 2022-02-02 | Stop reason: SDUPTHER

## 2021-09-27 RX ORDER — METFORMIN HYDROCHLORIDE 500 MG/1
TABLET, EXTENDED RELEASE ORAL
Qty: 90 TABLET | Refills: 1 | Status: SHIPPED | OUTPATIENT
Start: 2021-09-27 | End: 2022-04-14

## 2021-12-06 ENCOUNTER — TELEPHONE (OUTPATIENT)
Dept: INTERNAL MEDICINE CLINIC | Age: 81
End: 2021-12-06

## 2021-12-06 NOTE — TELEPHONE ENCOUNTER
Kennedi from [facility] is asking for the patients current medication list to be faxed to [phone].  PSR is unsure how to fax the medication list.

## 2022-02-02 ENCOUNTER — OFFICE VISIT (OUTPATIENT)
Dept: INTERNAL MEDICINE CLINIC | Age: 82
End: 2022-02-02
Payer: MEDICARE

## 2022-02-02 VITALS
RESPIRATION RATE: 16 BRPM | WEIGHT: 185.2 LBS | HEIGHT: 69 IN | DIASTOLIC BLOOD PRESSURE: 72 MMHG | HEART RATE: 63 BPM | SYSTOLIC BLOOD PRESSURE: 114 MMHG | OXYGEN SATURATION: 96 % | TEMPERATURE: 98.6 F | BODY MASS INDEX: 27.43 KG/M2

## 2022-02-02 DIAGNOSIS — E11.9 TYPE 2 DIABETES MELLITUS WITH HEMOGLOBIN A1C GOAL OF LESS THAN 7.0% (HCC): Primary | ICD-10-CM

## 2022-02-02 DIAGNOSIS — G30.1 LATE ONSET ALZHEIMER'S DISEASE WITHOUT BEHAVIORAL DISTURBANCE (HCC): ICD-10-CM

## 2022-02-02 DIAGNOSIS — M25.551 RIGHT HIP PAIN: ICD-10-CM

## 2022-02-02 DIAGNOSIS — G40.909 SEIZURE DISORDER (HCC): ICD-10-CM

## 2022-02-02 DIAGNOSIS — F02.80 LATE ONSET ALZHEIMER'S DISEASE WITHOUT BEHAVIORAL DISTURBANCE (HCC): ICD-10-CM

## 2022-02-02 DIAGNOSIS — F32.1 MODERATE MAJOR DEPRESSION (HCC): ICD-10-CM

## 2022-02-02 DIAGNOSIS — I10 ESSENTIAL HYPERTENSION: ICD-10-CM

## 2022-02-02 DIAGNOSIS — E78.5 HYPERLIPIDEMIA LDL GOAL <100: ICD-10-CM

## 2022-02-02 PROCEDURE — 99214 OFFICE O/P EST MOD 30 MIN: CPT | Performed by: INTERNAL MEDICINE

## 2022-02-02 PROCEDURE — G8752 SYS BP LESS 140: HCPCS | Performed by: INTERNAL MEDICINE

## 2022-02-02 PROCEDURE — 1101F PT FALLS ASSESS-DOCD LE1/YR: CPT | Performed by: INTERNAL MEDICINE

## 2022-02-02 PROCEDURE — G8536 NO DOC ELDER MAL SCRN: HCPCS | Performed by: INTERNAL MEDICINE

## 2022-02-02 PROCEDURE — G0463 HOSPITAL OUTPT CLINIC VISIT: HCPCS | Performed by: INTERNAL MEDICINE

## 2022-02-02 PROCEDURE — G8754 DIAS BP LESS 90: HCPCS | Performed by: INTERNAL MEDICINE

## 2022-02-02 PROCEDURE — G8419 CALC BMI OUT NRM PARAM NOF/U: HCPCS | Performed by: INTERNAL MEDICINE

## 2022-02-02 PROCEDURE — G8510 SCR DEP NEG, NO PLAN REQD: HCPCS | Performed by: INTERNAL MEDICINE

## 2022-02-02 PROCEDURE — G8427 DOCREV CUR MEDS BY ELIG CLIN: HCPCS | Performed by: INTERNAL MEDICINE

## 2022-02-02 RX ORDER — ATORVASTATIN CALCIUM 10 MG/1
10 TABLET, FILM COATED ORAL DAILY
Qty: 90 TABLET | Refills: 1 | Status: SHIPPED | OUTPATIENT
Start: 2022-02-02 | End: 2022-07-31

## 2022-02-02 NOTE — PROGRESS NOTES
Fabian Miller is a 80 y.o. male who presents today for Follow-up (RM18// pt presenting today for routine DM, HTN chk)  . He has a history of   Patient Active Problem List   Diagnosis Code    Seizure disorder ([facility] [address] G40.909    ED (erectile dysfunction) N52.9    Bilateral inguinal hernia K40.20    Appendicitis K37    Advanced care planning/counseling discussion Z71.89    Type 2 diabetes mellitus with hemoglobin A1c goal of less than 7.0% (HCC) E11.9    Hyperlipidemia LDL goal <100 E78.5    Essential hypertension I10    Adenomatous polyp of colon D12.6    Memory problem R41.3    Anxiety F41.9    MCI (mild cognitive impairment) G31.84    Late onset Alzheimer's disease without behavioral disturbance (HCC) G30.1, F02.80    Pain of right hip M25.551   . Today patient is here for follow-up. Continues to have significant right hip pain. Has been in physical therapy for some time. We discussed considering seeing an orthopedist.    Diabetes Mellitus follow-up- on metformin  Last hemoglobin a1c   Lab Results   Component Value Date/Time    Hemoglobin A1c 6.0 (H) 08/04/2021 01:00 PM    Hemoglobin A1c (POC) 5.5 02/06/2020 10:55 AM     No components found for: POCA1C, HBA1C POC  medication compliance: compliant all of the time. Diabetic diet compliance: compliant most of the time. Further diabetic ROS: no polyuria or polydipsia, no chest pain, dyspnea or TIA's, no numbness, tingling or pain in extremities. Microalbuminuria:   Lab Results   Component Value Date/Time    Microalbumin/Creat ratio (mg/g creat) 4 08/04/2021 01:00 PM    Microalbumin,urine random 0.78 08/04/2021 01:00 PM     Hypertension   Hypertension ROS: taking medications as instructed, no medication side effects noted, no TIA's, no chest pain on exertion, no dyspnea on exertion, no swelling of ankles     reports that he has never smoked. He has never used smokeless tobacco.    reports no history of alcohol use.    BP Readings from Last 2 Encounters:   02/02/22 114/72   08/04/21 122/84     Hyperlipidemia  Remains on atorvastatin  ROS: taking medications as instructed, no medication side effects noted  No new myalgias, no joint pains, no weakness  No TIA's, no chest pain on exertion, no dyspnea on exertion, no swelling of ankles. Lab Results   Component Value Date/Time    Cholesterol, total 165 08/04/2021 01:00 PM    HDL Cholesterol 57 08/04/2021 01:00 PM    LDL, calculated 83.2 08/04/2021 01:00 PM    VLDL, calculated 24.8 08/04/2021 01:00 PM    Triglyceride 124 08/04/2021 01:00 PM    CHOL/HDL Ratio 2.9 08/04/2021 01:00 PM     Dementia/anxiety: Patient will follow up with neurologist Dr. Jory Grover. Will be seeing a new partner. He remains on an SSRI as well as Namenda. Reports that mood overall is stable. Sz d/o: seeing neurology. No problems in months. Recent EEG noted as normal.     ROS  Review of Systems   Constitutional: Negative for chills, fever and weight loss. HENT: Negative for congestion and sore throat. Eyes: Negative for blurred vision, double vision and photophobia. Respiratory: Negative for cough and shortness of breath. Cardiovascular: Negative for chest pain, palpitations and leg swelling. Gastrointestinal: Negative for abdominal pain, constipation, diarrhea, heartburn, nausea and vomiting. Genitourinary: Negative for dysuria, frequency and urgency. Musculoskeletal: Positive for joint pain. Negative for myalgias. Skin: Negative for rash. Neurological: Negative. Negative for headaches. Endo/Heme/Allergies: Does not bruise/bleed easily. Psychiatric/Behavioral: Negative for memory loss and suicidal ideas.        Visit Vitals  /72 (BP 1 Location: Left upper arm, BP Patient Position: Sitting, BP Cuff Size: Large adult)   Pulse 63   Temp 98.6 °F (37 °C) (Oral)   Resp 16   Ht 5' 9\" (1.753 m)   Wt 185 lb 3.2 oz (84 kg)   SpO2 96%   BMI 27.35 kg/m²       Physical Exam  Constitutional:       Appearance: He is well-developed. He is not diaphoretic. HENT:      Head: Normocephalic and atraumatic. Eyes:      Pupils: Pupils are equal, round, and reactive to light. Cardiovascular:      Rate and Rhythm: Normal rate and regular rhythm. Heart sounds: No murmur heard. Pulmonary:      Effort: Pulmonary effort is normal. No respiratory distress. Breath sounds: Normal breath sounds. Abdominal:      General: Abdomen is flat. Palpations: Abdomen is soft. Musculoskeletal:         General: No swelling. Comments: Pain noted with range of motion of right hip. Skin:     General: Skin is warm and dry. Neurological:      Mental Status: He is alert. Comments: Oriented to person and situation. Cannot remember medications that he is on. Psychiatric:         Behavior: Behavior normal.           Current Outpatient Medications   Medication Sig    atorvastatin (LIPITOR) 10 mg tablet Take 1 Tablet by mouth daily.  metFORMIN ER (GLUCOPHAGE XR) 500 mg tablet TAKE 1 TABLET BY MOUTH DAILY WITH DINNER    valsartan (DIOVAN) 80 mg tablet TAKE ONE TABLET BY MOUTH DAILY    fluticasone propionate (FLONASE) 50 mcg/actuation nasal spray 2 Sprays by Both Nostrils route daily.  memantine (NAMENDA) 10 mg tablet Take  by mouth two (2) times a day.  VITAMIN D2 50,000 unit capsule TAKE ONE CAPSULE BY MOUTH EVERY 7 DAYS    lacosamide (VIMPAT) 150 mg tab tablet Take 1 Tab by mouth two (2) times a day. Max Daily Amount: 300 mg.  aspirin delayed-release (ASPIR-81) 81 mg tablet Take 81 mg by mouth nightly.  KEPPRA 1,000 mg tablet 1 in am  2 in pm  Indications: TONIC-CLONIC EPILEPSY TREATMENT ADJUNCT    citalopram (CeleXA) 20 mg tablet Take 20 mg by mouth daily. No current facility-administered medications for this visit.         Past Medical History:   Diagnosis Date    Appendicitis 9/13/2012    ED (erectile dysfunction) 2/18/2009    HTN 2/18/2009    Hypercholesterolemia 2/18/2009    Seizure disorder ([address] 2/18/2009    last seizure 4-2011 focal    Stroke [facility])     hx  old lacunar infarct      Past Surgical History:   Procedure Laterality Date    COLONOSCOPY  2002    COLONOSCOPY N/A 10/23/2017    COLONOSCOPY performed by Dipak Ambrosio MD at [facility] HX APPENDECTOMY  2012    HX HERNIA REPAIR  2011      Social History     Tobacco Use    Smoking status: Never Smoker    Smokeless tobacco: Never Used   Substance Use Topics    Alcohol use: No      Family History   Problem Relation Age of Onset    Hypertension Mother     Stroke Mother    [facility] Dementia Father     Hypertension Sister     Hypertension Brother     Diabetes Other     Cancer Neg Hx         No Known Allergies     Assessment/Plan  Diagnoses and all orders for this visit:    1. Type 2 diabetes mellitus with hemoglobin A1c goal of less than 7.0% (HCC)-blood sugars have been well controlled. -     HEMOGLOBIN A1C WITH EAG; Future    2. Late onset Alzheimer's disease without behavioral disturbance (HCC)-we will be establishing with a new neurologist as previous one has retired. Overall memory is stable. He does have good help at home. Home is safe    3. Moderate major depression (HCC)-mental state good    4. Seizure disorder (HCC)-no recent issues    5. Right hip pain-has done significant physical therapy in the past.  Would suggest seeing orthopedist as this is still quite physically limiting to him  -     REFERRAL TO ORTHOPEDICS    6. Essential hypertension-stable  -     METABOLIC PANEL, BASIC; Future    7. Hyperlipidemia LDL goal <100-continue current therapy    Other orders  -     atorvastatin (LIPITOR) 10 mg tablet; Take 1 Tablet by mouth daily. Dee Quezada MD  2/2/2022    This note was created with the help of speech recognition software [employer] and may contain some 'sound alike' errors.

## 2022-02-02 NOTE — PROGRESS NOTES
Mari Corrales  Identified pt with two pt identifiers(name and ). Chief Complaint   Patient presents with    Follow-up     RM18// pt presenting today for routine DM, HTN chk       1. Have you been to the ER, urgent care clinic since your last visit? Hospitalized since your last visit? NO    2. Have you seen or consulted any other health care providers outside of the [address] [place] [address] since your last visit? Include any pap smears or colon screening. NO      Provider notified of reason for visit, vitals and flowsheets obtained on patients.      Patient received paperwork for advance directive during previous visit but has not completed at this time     Reviewed record In preparation for visit, huddled with provider and have obtained necessary documentation      Health Maintenance Due   Topic    COVID-19 Vaccine (1)    DTaP/Tdap/Td series (1 - Tdap)    Shingrix Vaccine Age 49> (1 of 2)    Eye Exam Retinal or Dilated     Medicare Yearly Exam     Flu Vaccine (1)    A1C test (Diabetic or Prediabetic)        Wt Readings from Last 3 Encounters:   22 185 lb 3.2 oz (84 kg)   21 188 lb 6.4 oz (85.5 kg)   20 178 lb (80.7 kg)     Temp Readings from Last 3 Encounters:   22 98.6 °F (37 °C) (Oral)   21 97.9 °F (36.6 °C) (Oral)   20 97.9 °F (36.6 °C) (Oral)     BP Readings from Last 3 Encounters:   22 114/72   21 122/84   20 98/72     Pulse Readings from Last 3 Encounters:   22 63   21 (!) 58   20 (!) 56     Vitals:    22 1203   BP: 114/72   Pulse: 63   Resp: 16   Temp: 98.6 °F (37 °C)   TempSrc: Oral   SpO2: 96%   Weight: 185 lb 3.2 oz (84 kg)   Height: 5' 9\" (1.753 m)   PainSc:   0 - No pain         Learning Assessment:  :     Learning Assessment 2014   PRIMARY LEARNER Patient   PRIMARY LANGUAGE ENGLISH   LEARNER PREFERENCE PRIMARY DEMONSTRATION   ANSWERED BY self   RELATIONSHIP SELF       Depression Screening:  :     3 most recent PHQ Screens 2/2/2022   Little interest or pleasure in doing things Several days   Feeling down, depressed, irritable, or hopeless Several days   Total Score PHQ 2 2   Trouble falling or staying asleep, or sleeping too much -   Feeling tired or having little energy -   Poor appetite, weight loss, or overeating -   Feeling bad about yourself - or that you are a failure or have let yourself or your family down -   Trouble concentrating on things such as school, work, reading, or watching TV -   Moving or speaking so slowly that other people could have noticed; or the opposite being so fidgety that others notice -   Thoughts of being better off dead, or hurting yourself in some way -   PHQ 9 Score -   How difficult have these problems made it for you to do your work, take care of your home and get along with others -       Fall Risk Assessment:  :     Fall Risk Assessment, last 12 mths 8/4/2021   Able to walk? Yes   Fall in past 12 months? 1   Do you feel unsteady? 1   Are you worried about falling 1   Is TUG test greater than 12 seconds? 0   Is the gait abnormal? 0   Number of falls in past 12 months 1   Fall with injury? 0       Abuse Screening:  :     Abuse Screening Questionnaire 2/6/2020 10/16/2019 3/28/2019 7/25/2018 7/20/2017   Do you ever feel afraid of your partner? N N N N N   Are you in a relationship with someone who physically or mentally threatens you? N N N N N   Is it safe for you to go home?  Y Y Y Y Y       ADL Screening:  :     ADL Assessment 7/25/2018   Feeding yourself No Help Needed   Getting from bed to chair No Help Needed   Getting dressed No Help Needed   Bathing or showering No Help Needed   Walk across the room (includes cane/walker) No Help Needed   Using the telphone No Help Needed   Taking your medications No Help Needed   Preparing meals No Help Needed   Managing money (expenses/bills) Help Needed   Moderately strenuous housework (laundry) No Help Needed   Shopping for personal items (toiletries/medicines) No Help Needed   Shopping for groceries No Help Needed   Driving Help Needed   Climbing a flight of stairs No Help Needed   Getting to places beyond walking distances Help Needed         Medication reconciliation up to date and corrected with patient at this time.

## 2022-02-03 LAB
ANION GAP SERPL CALC-SCNC: 5 MMOL/L (ref 5–15)
BUN SERPL-MCNC: 16 MG/DL (ref 6–20)
BUN/CREAT SERPL: 13 (ref 12–20)
CALCIUM SERPL-MCNC: 9.6 MG/DL (ref 8.5–10.1)
CHLORIDE SERPL-SCNC: 109 MMOL/L (ref 97–108)
CO2 SERPL-SCNC: 25 MMOL/L (ref 21–32)
CREAT SERPL-MCNC: 1.23 MG/DL (ref 0.7–1.3)
EST. AVERAGE GLUCOSE BLD GHB EST-MCNC: 120 MG/DL
GLUCOSE SERPL-MCNC: 70 MG/DL (ref 65–100)
HBA1C MFR BLD: 5.8 % (ref 4–5.6)
POTASSIUM SERPL-SCNC: 4.4 MMOL/L (ref 3.5–5.1)
SODIUM SERPL-SCNC: 139 MMOL/L (ref 136–145)

## 2022-03-18 PROBLEM — F41.9 ANXIETY: Status: ACTIVE | Noted: 2019-03-28

## 2022-03-19 PROBLEM — F02.80 LATE ONSET ALZHEIMER'S DISEASE WITHOUT BEHAVIORAL DISTURBANCE (HCC): Status: ACTIVE | Noted: 2021-08-04

## 2022-03-19 PROBLEM — D12.6 ADENOMATOUS POLYP OF COLON: Status: ACTIVE | Noted: 2018-01-30

## 2022-03-19 PROBLEM — G30.1 LATE ONSET ALZHEIMER'S DISEASE WITHOUT BEHAVIORAL DISTURBANCE (HCC): Status: ACTIVE | Noted: 2021-08-04

## 2022-03-19 PROBLEM — I10 ESSENTIAL HYPERTENSION: Status: ACTIVE | Noted: 2017-07-20

## 2022-03-19 PROBLEM — R41.3 MEMORY PROBLEM: Status: ACTIVE | Noted: 2018-04-24

## 2022-03-19 PROBLEM — M25.551 PAIN OF RIGHT HIP: Status: ACTIVE | Noted: 2022-02-02

## 2022-03-19 PROBLEM — G31.84 MCI (MILD COGNITIVE IMPAIRMENT): Status: ACTIVE | Noted: 2019-03-28

## 2022-04-14 RX ORDER — METFORMIN HYDROCHLORIDE 500 MG/1
TABLET, EXTENDED RELEASE ORAL
Qty: 90 TABLET | Refills: 1 | Status: SHIPPED | OUTPATIENT
Start: 2022-04-14 | End: 2022-09-10

## 2022-07-31 RX ORDER — ATORVASTATIN CALCIUM 10 MG/1
TABLET, FILM COATED ORAL
Qty: 90 TABLET | Refills: 1 | Status: SHIPPED | OUTPATIENT
Start: 2022-07-31

## 2022-08-15 DIAGNOSIS — I10 ESSENTIAL HYPERTENSION: ICD-10-CM

## 2022-08-15 RX ORDER — VALSARTAN 80 MG/1
TABLET ORAL
Qty: 90 TABLET | Refills: 1 | Status: SHIPPED | OUTPATIENT
Start: 2022-08-15

## 2022-09-10 RX ORDER — METFORMIN HYDROCHLORIDE 500 MG/1
TABLET, EXTENDED RELEASE ORAL
Qty: 90 TABLET | Refills: 1 | Status: SHIPPED | OUTPATIENT
Start: 2022-09-10

## 2023-04-30 RX ORDER — CITALOPRAM 20 MG/1
TABLET ORAL DAILY
COMMUNITY

## 2023-04-30 RX ORDER — ASPIRIN 81 MG/1
TABLET ORAL
COMMUNITY

## 2023-04-30 RX ORDER — LEVETIRACETAM 1000 MG/1
TABLET ORAL
COMMUNITY
Start: 2010-04-20

## 2023-04-30 RX ORDER — FLUTICASONE PROPIONATE 50 MCG
2 SPRAY, SUSPENSION (ML) NASAL DAILY
COMMUNITY
Start: 2021-08-04

## 2023-04-30 RX ORDER — VALSARTAN 80 MG/1
TABLET ORAL DAILY
COMMUNITY
Start: 2023-02-05 | End: 2023-06-23 | Stop reason: SDUPTHER

## 2023-04-30 RX ORDER — ERGOCALCIFEROL 1.25 MG/1
CAPSULE ORAL
COMMUNITY
Start: 2019-01-13

## 2023-04-30 RX ORDER — LACOSAMIDE 150 MG/1
TABLET ORAL 2 TIMES DAILY
COMMUNITY
Start: 2018-01-30

## 2023-04-30 RX ORDER — MEMANTINE HYDROCHLORIDE 10 MG/1
TABLET ORAL 2 TIMES DAILY
COMMUNITY

## 2023-04-30 RX ORDER — METFORMIN HYDROCHLORIDE 500 MG/1
TABLET, EXTENDED RELEASE ORAL
COMMUNITY
Start: 2022-09-10 | End: 2023-06-23 | Stop reason: SDUPTHER

## 2023-04-30 RX ORDER — ATORVASTATIN CALCIUM 10 MG/1
TABLET, FILM COATED ORAL DAILY
COMMUNITY
Start: 2023-02-05 | End: 2023-06-23 | Stop reason: SDUPTHER

## 2023-05-03 DIAGNOSIS — I10 ESSENTIAL HYPERTENSION: ICD-10-CM

## 2023-05-03 RX ORDER — VALSARTAN 80 MG/1
TABLET ORAL
Qty: 90 TABLET | Refills: 0 | Status: SHIPPED | OUTPATIENT
Start: 2023-05-03

## 2023-05-03 RX ORDER — METFORMIN HYDROCHLORIDE 500 MG/1
TABLET, EXTENDED RELEASE ORAL
Qty: 90 TABLET | Refills: 1 | Status: SHIPPED | OUTPATIENT
Start: 2023-05-03

## 2023-05-03 RX ORDER — ATORVASTATIN CALCIUM 10 MG/1
TABLET, FILM COATED ORAL
Qty: 90 TABLET | Refills: 0 | Status: SHIPPED | OUTPATIENT
Start: 2023-05-03

## 2023-06-23 ENCOUNTER — OFFICE VISIT (OUTPATIENT)
Age: 83
End: 2023-06-23
Payer: MEDICARE

## 2023-06-23 VITALS
OXYGEN SATURATION: 94 % | BODY MASS INDEX: 27.93 KG/M2 | RESPIRATION RATE: 20 BRPM | HEART RATE: 69 BPM | DIASTOLIC BLOOD PRESSURE: 71 MMHG | HEIGHT: 69 IN | TEMPERATURE: 97.9 F | WEIGHT: 188.6 LBS | SYSTOLIC BLOOD PRESSURE: 116 MMHG

## 2023-06-23 DIAGNOSIS — M25.551 PAIN OF RIGHT HIP: ICD-10-CM

## 2023-06-23 DIAGNOSIS — G31.84 MCI (MILD COGNITIVE IMPAIRMENT): ICD-10-CM

## 2023-06-23 DIAGNOSIS — E11.9 TYPE 2 DIABETES MELLITUS WITH HEMOGLOBIN A1C GOAL OF LESS THAN 7.0% (HCC): ICD-10-CM

## 2023-06-23 DIAGNOSIS — E78.5 HYPERLIPIDEMIA LDL GOAL <100: ICD-10-CM

## 2023-06-23 DIAGNOSIS — F32.1 MAJOR DEPRESSIVE DISORDER, SINGLE EPISODE, MODERATE (HCC): ICD-10-CM

## 2023-06-23 DIAGNOSIS — R41.89 OTHER SYMPTOMS AND SIGNS INVOLVING COGNITIVE FUNCTIONS AND AWARENESS: ICD-10-CM

## 2023-06-23 DIAGNOSIS — I10 ESSENTIAL HYPERTENSION: Primary | ICD-10-CM

## 2023-06-23 DIAGNOSIS — G40.909 SEIZURE DISORDER (HCC): ICD-10-CM

## 2023-06-23 PROBLEM — G30.1 LATE ONSET ALZHEIMER'S DISEASE WITHOUT BEHAVIORAL DISTURBANCE (HCC): Status: RESOLVED | Noted: 2021-08-04 | Resolved: 2023-06-23

## 2023-06-23 PROBLEM — F02.80 LATE ONSET ALZHEIMER'S DISEASE WITHOUT BEHAVIORAL DISTURBANCE (HCC): Status: RESOLVED | Noted: 2021-08-04 | Resolved: 2023-06-23

## 2023-06-23 PROCEDURE — 1123F ACP DISCUSS/DSCN MKR DOCD: CPT | Performed by: INTERNAL MEDICINE

## 2023-06-23 PROCEDURE — G8419 CALC BMI OUT NRM PARAM NOF/U: HCPCS | Performed by: INTERNAL MEDICINE

## 2023-06-23 PROCEDURE — G8428 CUR MEDS NOT DOCUMENT: HCPCS | Performed by: INTERNAL MEDICINE

## 2023-06-23 PROCEDURE — 99214 OFFICE O/P EST MOD 30 MIN: CPT | Performed by: INTERNAL MEDICINE

## 2023-06-23 PROCEDURE — 3074F SYST BP LT 130 MM HG: CPT | Performed by: INTERNAL MEDICINE

## 2023-06-23 PROCEDURE — 4004F PT TOBACCO SCREEN RCVD TLK: CPT | Performed by: INTERNAL MEDICINE

## 2023-06-23 PROCEDURE — 3078F DIAST BP <80 MM HG: CPT | Performed by: INTERNAL MEDICINE

## 2023-06-23 RX ORDER — VALSARTAN 80 MG/1
80 TABLET ORAL DAILY
Qty: 90 TABLET | Refills: 1 | Status: SHIPPED | OUTPATIENT
Start: 2023-06-23

## 2023-06-23 RX ORDER — ATORVASTATIN CALCIUM 10 MG/1
10 TABLET, FILM COATED ORAL DAILY
Qty: 90 TABLET | Refills: 1 | Status: SHIPPED | OUTPATIENT
Start: 2023-06-23

## 2023-06-23 RX ORDER — METFORMIN HYDROCHLORIDE 500 MG/1
TABLET, EXTENDED RELEASE ORAL
Qty: 90 TABLET | Refills: 1 | Status: SHIPPED | OUTPATIENT
Start: 2023-06-23

## 2023-06-23 ASSESSMENT — ENCOUNTER SYMPTOMS
EYE ITCHING: 0
DIARRHEA: 0
WHEEZING: 0
EYE DISCHARGE: 0
EYE PAIN: 0
PHOTOPHOBIA: 0
APNEA: 0
BACK PAIN: 0
CONSTIPATION: 0
SHORTNESS OF BREATH: 0
EYE REDNESS: 0
ABDOMINAL PAIN: 0

## 2023-06-23 NOTE — PROGRESS NOTES
Noam iNxon is a [de-identified] y.o. male who presents today for No chief complaint on file. Catherin Mohs He has a history of   Patient Active Problem List   Diagnosis    Seizure disorder ([address]    Anxiety    Bilateral inguinal hernia    Memory problem    ED (erectile dysfunction)    Hyperlipidemia LDL goal <100    Pain of right hip    Advanced care planning/counseling discussion    Essential hypertension    Type 2 diabetes mellitus with hemoglobin A1c goal of less than 7.0% (HCC)    Adenomatous polyp of colon    MCI (mild cognitive impairment)    Appendicitis    Major depressive disorder, single episode, moderate ([address]   . Today patient is here for follow-up. Hip and musculoskeletal concerns have been stable overall. Admits that he is less active. Memory loss has slightly progressed per wife. Continues to follow with neurologist.  Remains on [address] Also on Celexa. Mood overall relatively stable. Diabetes Mellitus follow-up- stable. Last hemoglobin a1c   Hemoglobin A1C   Date Value Ref Range Status   02/02/2022 5.8 (H) 4.0 - 5.6 % Final     Comment:     NEW METHOD PLEASE NOTE NEW REFERENCE RANGE  (NOTE)  HbA1C Interpretive Ranges  <5.7              Normal  5.7 - 6.4         Consider Prediabetes  >6.5              Consider Diabetes       No components found for: POCA1C, HBA1C POC  medication compliance: compliant all of the time. Diabetic diet compliance: compliant most of the time. Microalbuminuria: No results found for: MCACR, MCA2, MCAU, MCAU2    Hypertension- BP is stable. Hypertension ROS: taking medications as instructed, no medication side effects noted, no TIA's, no chest pain on exertion, no dyspnea on exertion, no swelling of ankles     reports that he has never smoked. He has never used smokeless tobacco.    reports no history of alcohol use. BP Readings from Last 2 Encounters:   06/23/23 116/71   02/02/22 114/72     Hyperlipidemia- on statin.   ROS: taking medications as instructed, no

## 2023-06-23 NOTE — PROGRESS NOTES
Genevieve Aceves is a [de-identified] y.o. male here for No chief complaint on file.       /71   Pulse 69   Temp 97.9 °F (36.6 °C)   Resp 20   Ht 5' 9\" (1.753 m)   Wt 188 lb 9.6 oz (85.5 kg)   SpO2 94%   BMI 27.85 kg/m²   Pt needs refills on all meds

## 2023-06-24 LAB
ALBUMIN SERPL-MCNC: 4.3 G/DL (ref 3.5–5)
ALBUMIN/GLOB SERPL: 1.2 (ref 1.1–2.2)
ALP SERPL-CCNC: 74 U/L (ref 45–117)
ALT SERPL-CCNC: 43 U/L (ref 12–78)
ANION GAP SERPL CALC-SCNC: 7 MMOL/L (ref 5–15)
AST SERPL-CCNC: 33 U/L (ref 15–37)
BASOPHILS # BLD: 0.1 K/UL (ref 0–0.1)
BASOPHILS NFR BLD: 1 % (ref 0–1)
BILIRUB SERPL-MCNC: 0.5 MG/DL (ref 0.2–1)
BUN SERPL-MCNC: 15 MG/DL (ref 6–20)
BUN/CREAT SERPL: 11 (ref 12–20)
CALCIUM SERPL-MCNC: 10 MG/DL (ref 8.5–10.1)
CHLORIDE SERPL-SCNC: 106 MMOL/L (ref 97–108)
CHOLEST SERPL-MCNC: 135 MG/DL
CO2 SERPL-SCNC: 28 MMOL/L (ref 21–32)
CREAT SERPL-MCNC: 1.4 MG/DL (ref 0.7–1.3)
CREAT UR-MCNC: 245 MG/DL
DIFFERENTIAL METHOD BLD: ABNORMAL
EOSINOPHIL # BLD: 0.2 K/UL (ref 0–0.4)
EOSINOPHIL NFR BLD: 2 % (ref 0–7)
ERYTHROCYTE [DISTWIDTH] IN BLOOD BY AUTOMATED COUNT: 12.1 % (ref 11.5–14.5)
EST. AVERAGE GLUCOSE BLD GHB EST-MCNC: 117 MG/DL
GLOBULIN SER CALC-MCNC: 3.5 G/DL (ref 2–4)
GLUCOSE SERPL-MCNC: 78 MG/DL (ref 65–100)
HBA1C MFR BLD: 5.7 % (ref 4–5.6)
HCT VFR BLD AUTO: 49.1 % (ref 36.6–50.3)
HDLC SERPL-MCNC: 53 MG/DL
HDLC SERPL: 2.5 (ref 0–5)
HGB BLD-MCNC: 15.5 G/DL (ref 12.1–17)
IMM GRANULOCYTES # BLD AUTO: 0 K/UL (ref 0–0.04)
IMM GRANULOCYTES NFR BLD AUTO: 0 % (ref 0–0.5)
LDLC SERPL CALC-MCNC: 61.4 MG/DL (ref 0–100)
LYMPHOCYTES # BLD: 2.4 K/UL (ref 0.8–3.5)
LYMPHOCYTES NFR BLD: 29 % (ref 12–49)
MCH RBC QN AUTO: 31.6 PG (ref 26–34)
MCHC RBC AUTO-ENTMCNC: 31.6 G/DL (ref 30–36.5)
MCV RBC AUTO: 100.2 FL (ref 80–99)
MICROALBUMIN UR-MCNC: 4.03 MG/DL
MICROALBUMIN/CREAT UR-RTO: 16 MG/G (ref 0–30)
MONOCYTES # BLD: 0.8 K/UL (ref 0–1)
MONOCYTES NFR BLD: 10 % (ref 5–13)
NEUTS SEG # BLD: 4.8 K/UL (ref 1.8–8)
NEUTS SEG NFR BLD: 58 % (ref 32–75)
NRBC # BLD: 0 K/UL (ref 0–0.01)
NRBC BLD-RTO: 0 PER 100 WBC
PLATELET # BLD AUTO: 128 K/UL (ref 150–400)
PMV BLD AUTO: 11.2 FL (ref 8.9–12.9)
POTASSIUM SERPL-SCNC: 4.6 MMOL/L (ref 3.5–5.1)
PROT SERPL-MCNC: 7.8 G/DL (ref 6.4–8.2)
RBC # BLD AUTO: 4.9 M/UL (ref 4.1–5.7)
SODIUM SERPL-SCNC: 141 MMOL/L (ref 136–145)
TRIGL SERPL-MCNC: 103 MG/DL
VLDLC SERPL CALC-MCNC: 20.6 MG/DL
WBC # BLD AUTO: 8.2 K/UL (ref 4.1–11.1)

## 2024-01-27 DIAGNOSIS — E78.5 HYPERLIPIDEMIA LDL GOAL <100: ICD-10-CM

## 2024-01-27 DIAGNOSIS — I10 ESSENTIAL HYPERTENSION: ICD-10-CM

## 2024-01-29 RX ORDER — VALSARTAN 80 MG/1
80 TABLET ORAL DAILY
Qty: 90 TABLET | Refills: 1 | Status: SHIPPED | OUTPATIENT
Start: 2024-01-29

## 2024-01-29 RX ORDER — ATORVASTATIN CALCIUM 10 MG/1
10 TABLET, FILM COATED ORAL DAILY
Qty: 90 TABLET | Refills: 1 | Status: SHIPPED | OUTPATIENT
Start: 2024-01-29

## 2024-04-28 DIAGNOSIS — E11.9 TYPE 2 DIABETES MELLITUS WITH HEMOGLOBIN A1C GOAL OF LESS THAN 7.0% (HCC): ICD-10-CM

## 2024-04-29 RX ORDER — METFORMIN HYDROCHLORIDE 500 MG/1
TABLET, EXTENDED RELEASE ORAL
Qty: 90 TABLET | Refills: 1 | Status: SHIPPED | OUTPATIENT
Start: 2024-04-29

## 2024-07-21 DIAGNOSIS — E78.5 HYPERLIPIDEMIA LDL GOAL <100: ICD-10-CM

## 2024-07-21 DIAGNOSIS — I10 ESSENTIAL HYPERTENSION: ICD-10-CM

## 2024-07-22 RX ORDER — VALSARTAN 80 MG/1
80 TABLET ORAL DAILY
Qty: 90 TABLET | Refills: 1 | Status: SHIPPED | OUTPATIENT
Start: 2024-07-22

## 2024-07-22 RX ORDER — ATORVASTATIN CALCIUM 10 MG/1
10 TABLET, FILM COATED ORAL DAILY
Qty: 90 TABLET | Refills: 1 | Status: SHIPPED | OUTPATIENT
Start: 2024-07-22

## 2024-08-21 ENCOUNTER — OFFICE VISIT (OUTPATIENT)
Age: 84
End: 2024-08-21
Payer: MEDICARE

## 2024-08-21 VITALS
HEART RATE: 66 BPM | HEIGHT: 69 IN | RESPIRATION RATE: 16 BRPM | BODY MASS INDEX: 27.25 KG/M2 | DIASTOLIC BLOOD PRESSURE: 74 MMHG | OXYGEN SATURATION: 98 % | TEMPERATURE: 98.7 F | WEIGHT: 184 LBS | SYSTOLIC BLOOD PRESSURE: 118 MMHG

## 2024-08-21 DIAGNOSIS — E78.5 HYPERLIPIDEMIA LDL GOAL <100: ICD-10-CM

## 2024-08-21 DIAGNOSIS — E11.9 TYPE 2 DIABETES MELLITUS WITH HEMOGLOBIN A1C GOAL OF LESS THAN 7.0% (HCC): ICD-10-CM

## 2024-08-21 DIAGNOSIS — F03.90 DEMENTIA WITHOUT BEHAVIORAL DISTURBANCE, PSYCHOTIC DISTURBANCE, MOOD DISTURBANCE, OR ANXIETY, UNSPECIFIED DEMENTIA SEVERITY, UNSPECIFIED DEMENTIA TYPE (HCC): ICD-10-CM

## 2024-08-21 DIAGNOSIS — F32.1 MAJOR DEPRESSIVE DISORDER, SINGLE EPISODE, MODERATE (HCC): ICD-10-CM

## 2024-08-21 DIAGNOSIS — G40.909 SEIZURE DISORDER (HCC): ICD-10-CM

## 2024-08-21 DIAGNOSIS — I10 ESSENTIAL HYPERTENSION: ICD-10-CM

## 2024-08-21 DIAGNOSIS — E11.9 TYPE 2 DIABETES MELLITUS WITH HEMOGLOBIN A1C GOAL OF LESS THAN 7.0% (HCC): Primary | ICD-10-CM

## 2024-08-21 DIAGNOSIS — Z91.81 AT HIGH RISK FOR FALLS: ICD-10-CM

## 2024-08-21 LAB
ALBUMIN SERPL-MCNC: 4 G/DL (ref 3.5–5)
ALBUMIN/GLOB SERPL: 1.2 (ref 1.1–2.2)
ALP SERPL-CCNC: 75 U/L (ref 45–117)
ALT SERPL-CCNC: 26 U/L (ref 12–78)
ANION GAP SERPL CALC-SCNC: 2 MMOL/L (ref 5–15)
AST SERPL-CCNC: 24 U/L (ref 15–37)
BASOPHILS # BLD: 0.1 K/UL (ref 0–0.1)
BASOPHILS NFR BLD: 1 % (ref 0–1)
BILIRUB SERPL-MCNC: 0.5 MG/DL (ref 0.2–1)
BUN SERPL-MCNC: 14 MG/DL (ref 6–20)
BUN/CREAT SERPL: 11 (ref 12–20)
CALCIUM SERPL-MCNC: 9.4 MG/DL (ref 8.5–10.1)
CHLORIDE SERPL-SCNC: 109 MMOL/L (ref 97–108)
CHOLEST SERPL-MCNC: 130 MG/DL
CO2 SERPL-SCNC: 30 MMOL/L (ref 21–32)
CREAT SERPL-MCNC: 1.3 MG/DL (ref 0.7–1.3)
CREAT UR-MCNC: 245 MG/DL
DIFFERENTIAL METHOD BLD: ABNORMAL
EOSINOPHIL # BLD: 0.2 K/UL (ref 0–0.4)
EOSINOPHIL NFR BLD: 2 % (ref 0–7)
ERYTHROCYTE [DISTWIDTH] IN BLOOD BY AUTOMATED COUNT: 12.2 % (ref 11.5–14.5)
EST. AVERAGE GLUCOSE BLD GHB EST-MCNC: 126 MG/DL
GLOBULIN SER CALC-MCNC: 3.4 G/DL (ref 2–4)
GLUCOSE SERPL-MCNC: 84 MG/DL (ref 65–100)
HBA1C MFR BLD: 6 % (ref 4–5.6)
HCT VFR BLD AUTO: 47.1 % (ref 36.6–50.3)
HDLC SERPL-MCNC: 51 MG/DL
HDLC SERPL: 2.5 (ref 0–5)
HGB BLD-MCNC: 14.9 G/DL (ref 12.1–17)
IMM GRANULOCYTES # BLD AUTO: 0 K/UL (ref 0–0.04)
IMM GRANULOCYTES NFR BLD AUTO: 0 % (ref 0–0.5)
LDLC SERPL CALC-MCNC: 47.2 MG/DL (ref 0–100)
LYMPHOCYTES # BLD: 2.2 K/UL (ref 0.8–3.5)
LYMPHOCYTES NFR BLD: 31 % (ref 12–49)
MCH RBC QN AUTO: 31.8 PG (ref 26–34)
MCHC RBC AUTO-ENTMCNC: 31.6 G/DL (ref 30–36.5)
MCV RBC AUTO: 100.6 FL (ref 80–99)
MICROALBUMIN UR-MCNC: 1.14 MG/DL
MICROALBUMIN/CREAT UR-RTO: 5 MG/G (ref 0–30)
MONOCYTES # BLD: 0.7 K/UL (ref 0–1)
MONOCYTES NFR BLD: 10 % (ref 5–13)
NEUTS SEG # BLD: 4 K/UL (ref 1.8–8)
NEUTS SEG NFR BLD: 56 % (ref 32–75)
NRBC # BLD: 0 K/UL (ref 0–0.01)
NRBC BLD-RTO: 0 PER 100 WBC
PLATELET # BLD AUTO: 115 K/UL (ref 150–400)
PMV BLD AUTO: 11.8 FL (ref 8.9–12.9)
POTASSIUM SERPL-SCNC: 4.1 MMOL/L (ref 3.5–5.1)
PROT SERPL-MCNC: 7.4 G/DL (ref 6.4–8.2)
RBC # BLD AUTO: 4.68 M/UL (ref 4.1–5.7)
SODIUM SERPL-SCNC: 141 MMOL/L (ref 136–145)
SPECIMEN HOLD: NORMAL
TRIGL SERPL-MCNC: 159 MG/DL
VLDLC SERPL CALC-MCNC: 31.8 MG/DL
WBC # BLD AUTO: 7.1 K/UL (ref 4.1–11.1)

## 2024-08-21 PROCEDURE — 3074F SYST BP LT 130 MM HG: CPT | Performed by: INTERNAL MEDICINE

## 2024-08-21 PROCEDURE — 3078F DIAST BP <80 MM HG: CPT | Performed by: INTERNAL MEDICINE

## 2024-08-21 PROCEDURE — G8419 CALC BMI OUT NRM PARAM NOF/U: HCPCS | Performed by: INTERNAL MEDICINE

## 2024-08-21 PROCEDURE — 99214 OFFICE O/P EST MOD 30 MIN: CPT | Performed by: INTERNAL MEDICINE

## 2024-08-21 PROCEDURE — 1123F ACP DISCUSS/DSCN MKR DOCD: CPT | Performed by: INTERNAL MEDICINE

## 2024-08-21 PROCEDURE — 1036F TOBACCO NON-USER: CPT | Performed by: INTERNAL MEDICINE

## 2024-08-21 PROCEDURE — G8428 CUR MEDS NOT DOCUMENT: HCPCS | Performed by: INTERNAL MEDICINE

## 2024-08-21 RX ORDER — ATORVASTATIN CALCIUM 10 MG/1
10 TABLET, FILM COATED ORAL DAILY
Qty: 90 TABLET | Refills: 1 | Status: SHIPPED | OUTPATIENT
Start: 2024-08-21

## 2024-08-21 RX ORDER — METFORMIN HYDROCHLORIDE 500 MG/1
TABLET, EXTENDED RELEASE ORAL
Qty: 90 TABLET | Refills: 1 | Status: SHIPPED | OUTPATIENT
Start: 2024-08-21

## 2024-08-21 RX ORDER — VALSARTAN 80 MG/1
80 TABLET ORAL DAILY
Qty: 90 TABLET | Refills: 1 | Status: SHIPPED | OUTPATIENT
Start: 2024-08-21

## 2024-08-21 SDOH — ECONOMIC STABILITY: INCOME INSECURITY: HOW HARD IS IT FOR YOU TO PAY FOR THE VERY BASICS LIKE FOOD, HOUSING, MEDICAL CARE, AND HEATING?: NOT HARD AT ALL

## 2024-08-21 SDOH — ECONOMIC STABILITY: FOOD INSECURITY: WITHIN THE PAST 12 MONTHS, THE FOOD YOU BOUGHT JUST DIDN'T LAST AND YOU DIDN'T HAVE MONEY TO GET MORE.: NEVER TRUE

## 2024-08-21 SDOH — ECONOMIC STABILITY: FOOD INSECURITY: WITHIN THE PAST 12 MONTHS, YOU WORRIED THAT YOUR FOOD WOULD RUN OUT BEFORE YOU GOT MONEY TO BUY MORE.: NEVER TRUE

## 2024-08-21 ASSESSMENT — ENCOUNTER SYMPTOMS
BACK PAIN: 0
ABDOMINAL PAIN: 0
SHORTNESS OF BREATH: 0
WHEEZING: 0
CONSTIPATION: 0
DIARRHEA: 0
COLOR CHANGE: 0

## 2024-08-21 ASSESSMENT — PATIENT HEALTH QUESTIONNAIRE - PHQ9
SUM OF ALL RESPONSES TO PHQ9 QUESTIONS 1 & 2: 0
SUM OF ALL RESPONSES TO PHQ QUESTIONS 1-9: 0
SUM OF ALL RESPONSES TO PHQ QUESTIONS 1-9: 0
1. LITTLE INTEREST OR PLEASURE IN DOING THINGS: NOT AT ALL
SUM OF ALL RESPONSES TO PHQ QUESTIONS 1-9: 0
2. FEELING DOWN, DEPRESSED OR HOPELESS: NOT AT ALL
SUM OF ALL RESPONSES TO PHQ QUESTIONS 1-9: 0

## 2024-08-21 NOTE — PROGRESS NOTES
General: There is no distension.      Palpations: Abdomen is soft.   Musculoskeletal:         General: No swelling.      Cervical back: Normal range of motion.      Comments: Foot exam  - skin intact, mild dryness w no fissures, no rashes, no significant ulcers or callous formation. Sensation intact by microfilament or light touch     Skin:     General: Skin is warm and dry.   Neurological:      General: No focal deficit present.      Mental Status: He is alert. Mental status is at baseline.   Psychiatric:         Mood and Affect: Mood normal.         Behavior: Behavior normal.           Current Outpatient Medications   Medication Sig    valsartan (DIOVAN) 80 MG tablet Take 1 tablet by mouth daily    metFORMIN (GLUCOPHAGE-XR) 500 MG extended release tablet TAKE 1 TABLET BY MOUTH DAILY WITH DINNER    atorvastatin (LIPITOR) 10 MG tablet Take 1 tablet by mouth daily    aspirin 81 MG EC tablet Take by mouth    citalopram (CELEXA) 20 MG tablet Take by mouth daily    ergocalciferol (ERGOCALCIFEROL) 1.25 MG (80179 UT) capsule TAKE ONE CAPSULE BY MOUTH EVERY 7 DAYS    fluticasone (FLONASE) 50 MCG/ACT nasal spray 2 sprays by Nasal route daily    lacosamide (VIMPAT) 150 MG TABS tablet Take by mouth 2 times daily.    levETIRAcetam (KEPPRA) 1000 MG tablet 1 in am  2 in pm  Indications: TONIC-CLONIC EPILEPSY TREATMENT ADJUNCT    memantine (NAMENDA) 10 MG tablet Take by mouth 2 times daily     No current facility-administered medications for this visit.        Past Medical History:   Diagnosis Date    Appendicitis 9/13/2012    ED (erectile dysfunction) 2/18/2009    Hypercholesterolemia 2/18/2009    Seizure disorder (HCC) 2/18/2009    last seizure 4-2011 focal    Stroke (HCC)     hx  old lacunar infarct      Past Surgical History:   Procedure Laterality Date    APPENDECTOMY  2012    COLONOSCOPY N/A 10/23/2017    COLONOSCOPY performed by Cecil Squires MD at [facility] ENDOSCOPY    COLONOSCOPY  2002    HERNIA REPAIR  2011      Social History

## 2025-04-04 DIAGNOSIS — I10 ESSENTIAL HYPERTENSION: ICD-10-CM

## 2025-04-04 DIAGNOSIS — E78.5 HYPERLIPIDEMIA LDL GOAL <100: ICD-10-CM

## 2025-04-04 RX ORDER — ATORVASTATIN CALCIUM 10 MG/1
10 TABLET, FILM COATED ORAL DAILY
Qty: 90 TABLET | Refills: 1 | Status: SHIPPED | OUTPATIENT
Start: 2025-04-04

## 2025-04-04 RX ORDER — VALSARTAN 80 MG/1
80 TABLET ORAL DAILY
Qty: 90 TABLET | Refills: 1 | Status: SHIPPED | OUTPATIENT
Start: 2025-04-04

## 2025-09-05 ENCOUNTER — OFFICE VISIT (OUTPATIENT)
Facility: CLINIC | Age: 85
End: 2025-09-05
Payer: MEDICARE

## 2025-09-05 VITALS
OXYGEN SATURATION: 98 % | BODY MASS INDEX: 27.7 KG/M2 | RESPIRATION RATE: 16 BRPM | TEMPERATURE: 98.2 F | WEIGHT: 187 LBS | HEIGHT: 69 IN | DIASTOLIC BLOOD PRESSURE: 74 MMHG | SYSTOLIC BLOOD PRESSURE: 114 MMHG | HEART RATE: 55 BPM

## 2025-09-05 DIAGNOSIS — F03.90 DEMENTIA WITHOUT BEHAVIORAL DISTURBANCE, PSYCHOTIC DISTURBANCE, MOOD DISTURBANCE, OR ANXIETY, UNSPECIFIED DEMENTIA SEVERITY, UNSPECIFIED DEMENTIA TYPE (HCC): ICD-10-CM

## 2025-09-05 DIAGNOSIS — R68.84 JAW PAIN: ICD-10-CM

## 2025-09-05 DIAGNOSIS — G40.909 SEIZURE DISORDER (HCC): ICD-10-CM

## 2025-09-05 DIAGNOSIS — Z91.81 AT HIGH RISK FOR FALLS: ICD-10-CM

## 2025-09-05 DIAGNOSIS — E11.9 TYPE 2 DIABETES MELLITUS WITH HEMOGLOBIN A1C GOAL OF LESS THAN 7.0% (HCC): Primary | ICD-10-CM

## 2025-09-05 DIAGNOSIS — Z71.89 ACP (ADVANCE CARE PLANNING): ICD-10-CM

## 2025-09-05 DIAGNOSIS — F32.1 MAJOR DEPRESSIVE DISORDER, SINGLE EPISODE, MODERATE (HCC): ICD-10-CM

## 2025-09-05 DIAGNOSIS — E78.5 HYPERLIPIDEMIA LDL GOAL <100: ICD-10-CM

## 2025-09-05 DIAGNOSIS — I10 ESSENTIAL HYPERTENSION: ICD-10-CM

## 2025-09-05 PROCEDURE — 1160F RVW MEDS BY RX/DR IN RCRD: CPT | Performed by: INTERNAL MEDICINE

## 2025-09-05 PROCEDURE — 1036F TOBACCO NON-USER: CPT | Performed by: INTERNAL MEDICINE

## 2025-09-05 PROCEDURE — 1159F MED LIST DOCD IN RCRD: CPT | Performed by: INTERNAL MEDICINE

## 2025-09-05 PROCEDURE — 99214 OFFICE O/P EST MOD 30 MIN: CPT | Performed by: INTERNAL MEDICINE

## 2025-09-05 PROCEDURE — G8427 DOCREV CUR MEDS BY ELIG CLIN: HCPCS | Performed by: INTERNAL MEDICINE

## 2025-09-05 PROCEDURE — 3074F SYST BP LT 130 MM HG: CPT | Performed by: INTERNAL MEDICINE

## 2025-09-05 PROCEDURE — 3078F DIAST BP <80 MM HG: CPT | Performed by: INTERNAL MEDICINE

## 2025-09-05 PROCEDURE — 1126F AMNT PAIN NOTED NONE PRSNT: CPT | Performed by: INTERNAL MEDICINE

## 2025-09-05 PROCEDURE — 3044F HG A1C LEVEL LT 7.0%: CPT | Performed by: INTERNAL MEDICINE

## 2025-09-05 PROCEDURE — 1123F ACP DISCUSS/DSCN MKR DOCD: CPT | Performed by: INTERNAL MEDICINE

## 2025-09-05 PROCEDURE — G8419 CALC BMI OUT NRM PARAM NOF/U: HCPCS | Performed by: INTERNAL MEDICINE

## 2025-09-05 RX ORDER — VALSARTAN 80 MG/1
80 TABLET ORAL DAILY
Qty: 90 TABLET | Refills: 1 | Status: SHIPPED | OUTPATIENT
Start: 2025-09-05

## 2025-09-05 RX ORDER — ATORVASTATIN CALCIUM 10 MG/1
10 TABLET, FILM COATED ORAL DAILY
Qty: 90 TABLET | Refills: 1 | Status: SHIPPED | OUTPATIENT
Start: 2025-09-05

## 2025-09-05 SDOH — ECONOMIC STABILITY: FOOD INSECURITY: WITHIN THE PAST 12 MONTHS, THE FOOD YOU BOUGHT JUST DIDN'T LAST AND YOU DIDN'T HAVE MONEY TO GET MORE.: NEVER TRUE

## 2025-09-05 SDOH — ECONOMIC STABILITY: FOOD INSECURITY: WITHIN THE PAST 12 MONTHS, YOU WORRIED THAT YOUR FOOD WOULD RUN OUT BEFORE YOU GOT MONEY TO BUY MORE.: NEVER TRUE

## 2025-09-05 ASSESSMENT — ENCOUNTER SYMPTOMS
CONSTIPATION: 0
SHORTNESS OF BREATH: 0
BACK PAIN: 0
DIARRHEA: 0
WHEEZING: 0
ABDOMINAL PAIN: 0

## 2025-09-05 ASSESSMENT — PATIENT HEALTH QUESTIONNAIRE - PHQ9
2. FEELING DOWN, DEPRESSED OR HOPELESS: NOT AT ALL
3. TROUBLE FALLING OR STAYING ASLEEP: NOT AT ALL
5. POOR APPETITE OR OVEREATING: NOT AT ALL
SUM OF ALL RESPONSES TO PHQ QUESTIONS 1-9: 0
6. FEELING BAD ABOUT YOURSELF - OR THAT YOU ARE A FAILURE OR HAVE LET YOURSELF OR YOUR FAMILY DOWN: NOT AT ALL
1. LITTLE INTEREST OR PLEASURE IN DOING THINGS: NOT AT ALL
4. FEELING TIRED OR HAVING LITTLE ENERGY: NOT AT ALL
9. THOUGHTS THAT YOU WOULD BE BETTER OFF DEAD, OR OF HURTING YOURSELF: NOT AT ALL
8. MOVING OR SPEAKING SO SLOWLY THAT OTHER PEOPLE COULD HAVE NOTICED. OR THE OPPOSITE, BEING SO FIGETY OR RESTLESS THAT YOU HAVE BEEN MOVING AROUND A LOT MORE THAN USUAL: NOT AT ALL
7. TROUBLE CONCENTRATING ON THINGS, SUCH AS READING THE NEWSPAPER OR WATCHING TELEVISION: NOT AT ALL
SUM OF ALL RESPONSES TO PHQ QUESTIONS 1-9: 0
10. IF YOU CHECKED OFF ANY PROBLEMS, HOW DIFFICULT HAVE THESE PROBLEMS MADE IT FOR YOU TO DO YOUR WORK, TAKE CARE OF THINGS AT HOME, OR GET ALONG WITH OTHER PEOPLE: NOT DIFFICULT AT ALL

## 2025-09-06 ENCOUNTER — RESULTS FOLLOW-UP (OUTPATIENT)
Facility: CLINIC | Age: 85
End: 2025-09-06

## (undated) DEVICE — SYRINGE 50ML E/T

## (undated) DEVICE — BAG SPEC BIOHZRD 10 X 10 IN --

## (undated) DEVICE — 3M™ CUROS™ DISINFECTING CAP FOR NEEDLELESS CONNECTORS 270/CARTON 20 CARTONS/CASE CFF1-270: Brand: CUROS™

## (undated) DEVICE — BASIN EMESIS 500CC ROSE 250/CS 60/PLT: Brand: MEDEGEN MEDICAL PRODUCTS, LLC

## (undated) DEVICE — KIT COLON W/ 1.1OZ LUB AND 2 END

## (undated) DEVICE — SYR 5ML 1/5 GRAD LL NSAF LF --

## (undated) DEVICE — SYR 3ML LL TIP 1/10ML GRAD --

## (undated) DEVICE — SNARE ENDOSCP M L240CM W27MM SHTH DIA2.4MM CHN 2.8MM OVL

## (undated) DEVICE — KENDALL RADIOLUCENT FOAM MONITORING ELECTRODE -RECTANGULAR SHAPE: Brand: KENDALL

## (undated) DEVICE — CATH IV AUTOGRD BC BLU 22GA 25 -- INSYTE

## (undated) DEVICE — Device

## (undated) DEVICE — TRAP SUC MUCOUS 70ML -- MEDICHOICE MEDLINE

## (undated) DEVICE — CANNULA CUSH AD W/ 14FT TBG

## (undated) DEVICE — SET ADMIN 16ML TBNG L100IN 2 Y INJ SITE IV PIGGY BK DISP

## (undated) DEVICE — SOLIDIFIER MEDC 1200ML -- CONVERT TO 356117

## (undated) DEVICE — 1200 GUARD II KIT W/5MM TUBE W/O VAC TUBE: Brand: GUARDIAN

## (undated) DEVICE — BAG BELONG PT PERS CLEAR HANDL

## (undated) DEVICE — FORCEPS BX L240CM JAW DIA2.8MM L CAP W/ NDL MIC MESH TOOTH

## (undated) DEVICE — NDL FLTR TIP 5 MIC 18GX1.5IN --